# Patient Record
Sex: FEMALE | Race: OTHER | URBAN - METROPOLITAN AREA
[De-identification: names, ages, dates, MRNs, and addresses within clinical notes are randomized per-mention and may not be internally consistent; named-entity substitution may affect disease eponyms.]

---

## 2017-01-25 ENCOUNTER — OUTPATIENT (OUTPATIENT)
Dept: OUTPATIENT SERVICES | Facility: HOSPITAL | Age: 67
LOS: 1 days | Discharge: HOME | End: 2017-01-25

## 2017-05-03 ENCOUNTER — HOSPITAL ENCOUNTER (INPATIENT)
Dept: HOSPITAL 14 - H.ER | Age: 67
LOS: 6 days | Discharge: NURSING, REHAB OR LONG-TERM CARE | DRG: 149 | End: 2017-05-09
Attending: INTERNAL MEDICINE | Admitting: INTERNAL MEDICINE
Payer: MEDICARE

## 2017-05-03 VITALS — BODY MASS INDEX: 35.3 KG/M2

## 2017-05-03 DIAGNOSIS — F41.9: ICD-10-CM

## 2017-05-03 DIAGNOSIS — F32.9: ICD-10-CM

## 2017-05-03 DIAGNOSIS — E87.6: ICD-10-CM

## 2017-05-03 DIAGNOSIS — E78.00: ICD-10-CM

## 2017-05-03 DIAGNOSIS — F03.90: ICD-10-CM

## 2017-05-03 DIAGNOSIS — I69.322: ICD-10-CM

## 2017-05-03 DIAGNOSIS — E11.9: ICD-10-CM

## 2017-05-03 DIAGNOSIS — I11.0: ICD-10-CM

## 2017-05-03 DIAGNOSIS — H83.2X9: Primary | ICD-10-CM

## 2017-05-03 DIAGNOSIS — I50.9: ICD-10-CM

## 2017-05-03 DIAGNOSIS — E78.5: ICD-10-CM

## 2017-05-03 LAB
ALBUMIN/GLOB SERPL: 1.2 {RATIO} (ref 1–2.1)
ALP SERPL-CCNC: 60 U/L (ref 38–126)
ALT SERPL-CCNC: 35 U/L (ref 9–52)
AST SERPL-CCNC: 33 U/L (ref 14–36)
BASOPHILS # BLD AUTO: 0.1 K/UL (ref 0–0.2)
BASOPHILS NFR BLD: 0.8 % (ref 0–2)
BILIRUB SERPL-MCNC: 1.2 MG/DL (ref 0.2–1.3)
BUN SERPL-MCNC: 23 MG/DL (ref 7–17)
CALCIUM SERPL-MCNC: 9.8 MG/DL (ref 8.4–10.2)
CHLORIDE SERPL-SCNC: 101 MMOL/L (ref 98–107)
CO2 SERPL-SCNC: 30 MMOL/L (ref 22–30)
EOSINOPHIL # BLD AUTO: 0.1 K/UL (ref 0–0.7)
EOSINOPHIL NFR BLD: 1.2 % (ref 0–4)
ERYTHROCYTE [DISTWIDTH] IN BLOOD BY AUTOMATED COUNT: 15.7 % (ref 11.5–14.5)
GLOBULIN SER-MCNC: 3.3 GM/DL (ref 2.2–3.9)
GLUCOSE SERPL-MCNC: 133 MG/DL (ref 65–105)
HCT VFR BLD CALC: 35.2 % (ref 34–47)
LYMPHOCYTES # BLD AUTO: 1.9 K/UL (ref 1–4.3)
LYMPHOCYTES NFR BLD AUTO: 19.5 % (ref 20–40)
MCH RBC QN AUTO: 27.8 PG (ref 27–31)
MCHC RBC AUTO-ENTMCNC: 33.2 G/DL (ref 33–37)
MCV RBC AUTO: 83.9 FL (ref 81–99)
MONOCYTES # BLD: 0.6 K/UL (ref 0–0.8)
MONOCYTES NFR BLD: 6 % (ref 0–10)
NEUTROPHILS # BLD: 7.2 K/UL (ref 1.8–7)
NEUTROPHILS NFR BLD AUTO: 72.5 % (ref 50–75)
NRBC BLD AUTO-RTO: 0.1 % (ref 0–0)
PLATELET # BLD: 238 K/UL (ref 130–400)
PMV BLD AUTO: 10.5 FL (ref 7.2–11.7)
POTASSIUM SERPL-SCNC: 3.7 MMOL/L (ref 3.6–5)
PROT SERPL-MCNC: 7.2 G/DL (ref 6.3–8.2)
SODIUM SERPL-SCNC: 141 MMOL/L (ref 132–148)
WBC # BLD AUTO: 9.9 K/UL (ref 4.8–10.8)

## 2017-05-03 RX ADMIN — LATANOPROST SCH DROP: 50 SOLUTION OPHTHALMIC at 23:02

## 2017-05-03 NOTE — RAD
HISTORY:

dizziness  



COMPARISON:

03/14/2014 



FINDINGS:



LUNGS:

Mild bibasilar atelectasis



PLEURA:

No significant pleural effusion identified, no pneumothorax apparent.



CARDIOVASCULAR:

Normal.



OSSEOUS STRUCTURES:

Re- demonstrated is deformity of the right humeral head as well as 

apparent old posttraumatic unfused fracture left humeral head clear 



VISUALIZED UPPER ABDOMEN:

Normal.



OTHER FINDINGS:

None.



IMPRESSION:

Mild bibasilar atelectasis

## 2017-05-03 NOTE — CON
DATE: 05/03/2017



REASON FOR CONSULTATION:  Dizziness.



HISTORY OF PRESENT ILLNESS:  The patient is a 67-year-old female who is brought to the Emergency Room
 with complaints of dizziness.  She has dizziness over the last 4 days; however, it was getting worse
.  Dizziness is described as spinning sensation.  She denies to have any new focal weakness in arms o
r legs.  She has history of cerebrovascular accident with slurring of speech and some weakness of the
 right side.



REVIEW OF SYSTEMS:  Denies any headache.  Positive for dizziness, denies any chest pain, shortness of
 breath.  Positive for some slurring of speech.  Denies any constipation, diarrhea, dysuria, pyuria, 
cough, sputum production, hallucinations.



PAST MEDICAL HISTORY:  Includes, diabetes mellitus, hypertension, hypercholesterolemia, cerebrovascul
ar accident.



MEDICATIONS AT HOME:  Include Rocaltrol, Abilify, aspirin, Januvia, Coreg, vitamin B complex, Xalatan
 eyedrops, Lamictal, Glucotrol, Norvasc, Lasix, omeprazole, Triglide, enalapril, Fosamax, and Lipitor
.



ALLERGIES:  No known drug allergies.



SOCIAL HISTORY:  Denies smoking, use of alcohol or use of any illicit drugs.



FAMILY HISTORY:  Reviewed and noncontributory to this case.



PHYSICAL EXAMINATION:

GENERAL:  The patient is an elderly pleasant female lying on the bed in no acute distress.

VITAL SIGNS:  Her blood pressure is 156/85, heart rate is 49 per minute, breathing at a rate of 16 pe
r minute and her temperature is 98.3 degrees Fahrenheit.

HEENT:  Normocephalic, atraumatic.

NECK:  Supple.  There are no carotid bruits.

LUNGS:  Clear.

CARDIOVASCULAR:  S1, S2 audible.  No murmurs.

ABDOMEN:  Soft, nontender, bowel sounds present.



NEUROLOGIC EXAMINATION:

MENTAL STATUS:  The patient is awake, alert, oriented to place, year, person.  Speech is dysarthric. 
 Naming and repetition is normal.  Memory and cognition appears intact.

CRANIAL NERVES:  Pupils are 2 mm, minimally reactive to light.  Visual fields are full.  Extraocular 
movements are intact.  There is slight decreased nasolabial fold on the right side.  Palate is upgoin
g bilaterally and tongue is midline.

MOTOR:  Tone is normal.  Power on the left side is 5/5.  Power on the right side is 4-5/5.

REFLEXES:  1+ and symmetrical.  Plantars downgoing bilaterally.

GAIT:  Deferred at the moment.



LABORATORY DATA:  Labs reviewed, shows WBC of 9.9, hemoglobin of 11.7, hematocrit 35.2 and platelets 
of 238.  Sodium is 141, potassium 3.7, chloride 101, carbon dioxide 30, BUN of 23, creatinine 1.3, an
d glucose of 133.  She had a CT scan of the head done which showed no acute intracranial abnormality.




IMPRESSION:  

1.  Dizziness, likely secondary to labyrinthine dysfunction.  Rule out any new cerebrovascular accide
nt.  

2.  History of old cerebrovascular accident with dysarthria.



RECOMMENDATIONS:

1. The patient to have MRI of the brain without contrast.

2. The patient also to have a carotid Doppler study.

3. The patient to have an electroencephalogram.

4. The patient to be started on meclizine.

5. The patient to be continued on aspirin and statin with history of previous stroke.

6. Please continue other treatment and supportive care.

7. The patient also to be seen by physical therapy.



Thank you for the opportunity to participate in the care of this patient.





__________________________________________

Christie Beck MD







cc:



DD: 05/03/2017 17:14:50  142

TT: 05/03/2017 21:14:57

Confirmation # [id number]

Dictation # [id number]

jn

## 2017-05-03 NOTE — CT
PROCEDURE:  CT HEAD WITHOUT CONTRAST.



HISTORY:

dizziness



COMPARISON:

06/23/2016. 



TECHNIQUE:

Axial computed tomography images were obtained through the head/brain 

without intravenous contrast.  



Radiation dose:



Total exam DLP = 844.31 mGy-cm.



This CT exam was performed using one or more of the following dose 

reduction techniques: Automated exposure control, adjustment of the 

mA and/or kV according to patient size, and/or use of iterative 

reconstruction technique.



FINDINGS:



HEMORRHAGE:

No intracranial hemorrhage. 



BRAIN:

No mass effect or edema.  Stable cortical atrophy and periventricular 

small vessel disease which appears be disproportionate to that 

expected to the degree of atrophy.  Nonetheless these findings are 

stable.



VENTRICLES:

Unremarkable. No hydrocephalus. 



CALVARIUM:

Unremarkable.



PARANASAL SINUSES:

Stable left sphenoid air cell disease.



MASTOID AIR CELLS:

Unremarkable as visualized. No inflammatory changes.



OTHER FINDINGS:

None.



IMPRESSION:

No acute intracranial abnormalities. No significant findings to 

account for the clinical presentation. No significant interval change 

compared to the prior examination(s).

## 2017-05-03 NOTE — ED PDOC
HPI: Neurologic





- General


Time Seen by Provider: 05/03/17 12:00


Chief Complaint (Nursing): Dizziness/Lightheaded


Chief Complaint (Provider): Dizziness/Lightheaded


Source: patient


Exam Limitations: clinical condition





- History of Present Illness


Timing/Duration: 1 week


Associated Symptoms: denies symptoms


Allergies/Adverse Reactions: 


Allergies





No Known Allergies Allergy (Verified 06/23/16 19:46)


 








Home Medications: 


Ambulatory Orders





ARIPiprazole [Abilify] 5 mg PO HS 05/03/17 


Alendronate [Fosamax] 70 mg PO QWK 05/03/17 


Aspirin [Aspirin EC] 325 mg PO DAILY 05/03/17 


Atorvastatin [Lipitor] 10 mg PO DAILY 05/03/17 


Calcitriol [Rocaltrol] 0.5 mcg PO MOFR 05/03/17 


Carvedilol [Coreg] 6.25 mg PO Q12H 05/03/17 


Dorzolamide 2%/Timolol 0.5% [Cosopt 2%-0.5% Opht] 1 drop EACHEYE BID 05/03/17 


Enalapril/Hydrochlorothiazide [Enalapril-Hctz 5-12.5 mg Tab] 1 tab PO DAILY 05/ 03/17 


Ergocalciferol (Vitamin D2) [Vitamin D2] 50,000 unit PO QWK 05/03/17 


Fenofibrate [Triglide] 160 mg PO DAILY 05/03/17 


Furosemide [Lasix] 40 mg PO DAILY 05/03/17 


GlipiZIDE [Glucotrol] 10 mg PO DAILY 05/03/17 


Latanoprost [Xalatan] 1 drop EACHEYE HS 05/03/17 


Omeprazole [Omeprazole] 20 mg PO DAILY 05/03/17 


SITagliptin [Januvia] 25 mg PO DAILY 05/03/17 


Vitamin B Complex/Vit C/Folic [Nephro-Julio Cesar] 1 tab PO DAILY 05/03/17 


amLODIPine [Norvasc] 10 mg PO DAILY 05/03/17 


lamoTRIgine [Lamictal] 50 mg PO HS 05/03/17 








Additional Complaint(s): 





Patient is a 67 year old female who presents to ED for dizziness for 4 days. 

Patients that's that the room is spinning. Denies any pain. no headache/

vomiting. denies fever/chills. History of CVA with right hemiparalysis 





DR. Martinez 





NIHSS Stroke Scale





- Date/Time Evaluation Performed


When Was NIHSS Performed: Baseline





- How Severe is the Stroke


Level of Consciousness: 0=Alert


LOC to Questions: 0=Both comments correct


LOC to commands: 0=Obeys both correctly


Best Gaze: 0=Normal


Visual: 0=No visual loss


Facial: 1=Minor asymmetry


Motor Arm - Left: 0=No drift


Motor Arm - Right: 1=Drift noted before 10 sec


Motor Leg - Left: 0=No drift


Motor Leg - Right: 1=Drift before 5 sec


Limb Ataxia: 0=Absent


Sensory: 0=Normal


Best Language: 1=Mild to moderate aphasia


Dysarthia: 1=Mild to moderate slurring


Extinction & Inattention (Neglect): 0=Normal, no object


Score: 5





rTPA Inclusion/Exclusion





- Refusal of Treatment


Patient Refused Treatment: No





- Inclusion Criteria for Altepase


Patient is 18 years or Older: Yes


The Clinical Diagnosis of Ischemic Stroke That is Causing a Potentially 

Disabling Neurological Deficit: No


Time of Onset is Well Established to be Less Than 270 Minute Before Treatment 

Would Begin: No


Risk/Benefit Discussed With Patient/Family Member Present: No





- Exclusion Criteria for Altepase


Uncontrolled Hypertension at Time of Treatment (Systolic BP above 185 or 

Diastolic BP above 110 mmHg): No





- Warning to TPA With Conditions


Following Conditions Weighed Against Anticipated Benefit: No





Past Medical History


Reviewed: Historical Data, Nursing Documentation, Vital Signs


Vital Signs: 





 Last Vital Signs











Temp  98.3 F   05/03/17 11:44


 


Pulse  51 L  05/03/17 11:44


 


Resp  18   05/03/17 11:44


 


BP  145/57 L  05/03/17 11:44


 


Pulse Ox  100   05/03/17 11:44














- Medical History


PMH: Anxiety, Arthritis, CHF, CVA (9 years ago in [place]), Dementia

, Depression, Diabetes, HTN, Hypercholesterolemia, Hyperlipidemia, Kidney Stones

, Schizophrenia, Seizures


   Denies: Chronic Kidney Disease





- Surgical History


Surgical History: No Surg Hx





- Family History


Family History: States: Unknown Family Hx





- Living Arrangements


Living Arrangements: With Family





- Social History


Current smoker - smoking cessation education provided: No


Alcohol: None


Drugs: Denies





- Immunization History


Hx Tetanus Toxoid Vaccination: No


Hx Influenza Vaccination: Yes


Hx Pneumococcal Vaccination: No





- Home Medications


Home Medications: 


 Ambulatory Orders











 Medication  Instructions  Recorded


 


ARIPiprazole [Abilify] 5 mg PO HS 05/03/17


 


Alendronate [Fosamax] 70 mg PO QWK 05/03/17


 


Aspirin [Aspirin EC] 325 mg PO DAILY 05/03/17


 


Atorvastatin [Lipitor] 10 mg PO DAILY 05/03/17


 


Calcitriol [Rocaltrol] 0.5 mcg PO MOFR 05/03/17


 


Carvedilol [Coreg] 6.25 mg PO Q12H 05/03/17


 


Dorzolamide 2%/Timolol 0.5% 1 drop EACHEYE BID 05/03/17





[Cosopt 2%-0.5% Opht]  


 


Enalapril/Hydrochlorothiazide 1 tab PO DAILY 05/03/17





[Enalapril-Hctz 5-12.5 mg Tab]  


 


Ergocalciferol (Vitamin D2) 50,000 unit PO QWK 05/03/17





[Vitamin D2]  


 


Fenofibrate [Triglide] 160 mg PO DAILY 05/03/17


 


Furosemide [Lasix] 40 mg PO DAILY 05/03/17


 


GlipiZIDE [Glucotrol] 10 mg PO DAILY 05/03/17


 


Latanoprost [Xalatan] 1 drop EACHEYE HS 05/03/17


 


Omeprazole [Omeprazole] 20 mg PO DAILY 05/03/17


 


SITagliptin [Januvia] 25 mg PO DAILY 05/03/17


 


Vitamin B Complex/Vit C/Folic 1 tab PO DAILY 05/03/17





[Nephro-Julio Cesar]  


 


amLODIPine [Norvasc] 10 mg PO DAILY 05/03/17


 


lamoTRIgine [Lamictal] 50 mg PO HS 05/03/17














- Allergies


Allergies/Adverse Reactions: 


 Allergies











Allergy/AdvReac Type Severity Reaction Status Date / Time


 


No Known Allergies Allergy   Verified 06/23/16 19:46














Review of Systems


ROS Statement: Except As Marked, All Systems Reviewed And Found Negative


Neurological: Positive for: Dizziness





Physical Exam





- Reviewed


Nursing Documentation Reviewed: Yes


Vital Signs Reviewed: Yes





- Physical Exam


Appears: Positive for: Non-toxic, No Acute Distress


Head Exam: Positive for: ATRAUMATIC, NORMAL INSPECTION


Skin: Positive for: Normal Color, Warm, DRY


Eye Exam: Positive for: EOMI, Normal appearance, PERRL


Neck: Positive for: Normal, Painless ROM


Cardiovascular/Chest: Positive for: Regular Rate, Rhythm.  Negative for: Murmur


Respiratory: Positive for: Normal Breath Sounds.  Negative for: Respiratory 

Distress


Gastrointestinal/Abdominal: Positive for: Normal Exam


Extremity: Positive for: Normal ROM.  Negative for: Pedal Edema


Neurologic/Psych: Positive for: Alert, Oriented, Motor/Sensory Deficits (r 

hemipareis due to old stroke), Aphasia (due to old stroke), Facial Droop (due 

to old storke), Other (Chronic right hemiparalysis)





- Laboratory Results


Result Diagrams: 


 05/04/17 05:10





 05/04/17 05:10





- ECG


ECG: Positive for: Interpreted By Me


ECG Rhythm: Positive for: Sinus Bradycardia.  Negative for: ST/T Changes


Rate: 49


O2 Sat by Pulse Oximetry: 100 (RA)


Pulse Ox Interpretation: Normal





- Radiology


X-Ray: Viewed By Me, Read By Radiologist


X-Ray Interpretation: Other (see MDM text for interpretations)





Medical Decision Making


Medical Decision Making: 





Time: 1210





Initial impression: Dizziness rule otu intracranial process, CVA





Initial plan:


-- EKG


-- CMP


-- Troponin


-- CBC


-- CXR





1401


Chest XRay read and reviewed by radiologist


FINDINGS:





LUNGS:


Mild bibasilar atelectasis





PLEURA:


No significant pleural effusion identified, no pneumothorax apparent.





CARDIOVASCULAR:


Normal.





OSSEOUS STRUCTURES:


Re- demonstrated is deformity of the right humeral head as well as apparent old 

posttraumatic unfused fracture left humeral head clear 





VISUALIZED UPPER ABDOMEN:


Normal.





OTHER FINDINGS:


None.





IMPRESSION:


Mild bibasilar atelectasis





1522


Upon reevaluation, pt is neurologically at baseline. 


1545


Upon reevaluation, patient reports feeling dizziness. Patient is given 

Meclizine and will be evaluated by speech pathology. after meclziine pt still 

dizzy  and will be admitted to [facility] on-call Dr morgan. Patient is being admitted 

under Dr. Morgan because her PMD is not affiliated with [facility].





1617


Paged  from neurology as he saw her in April during admission for prior 

stroke


when he called back he said to give case to neurology on call.





1640


Dr Beck called for consult. no answer back.





dx dizziness 





Scribe Attestation:


Documented by Lanie Green acting as a scribe for Cyndy Bailon MD MD Scribe Attestation:


All medical record entries made by the Scribe were at my direction and 

personally dictated by me. I have reviewed the chart and agree that the record 

accurately reflects my personal performance of the history, physical exam, 

medical decision making, and the department course for this patient. I have 

also personally directed, reviewed, and agree with the discharge instructions 

and disposition.








Disposition





- Clinical Impression


Clinical Impression: 


 Dizziness








- Patient ED Disposition


Is Patient to be Admitted: Yes





- Disposition


Disposition Time: 15:00


Condition: FAIR

## 2017-05-04 LAB
ALBUMIN/GLOB SERPL: 1.2 {RATIO} (ref 1–2.1)
ALP SERPL-CCNC: 69 U/L (ref 38–126)
ALT SERPL-CCNC: 37 U/L (ref 9–52)
AST SERPL-CCNC: 33 U/L (ref 14–36)
BILIRUB SERPL-MCNC: 1.6 MG/DL (ref 0.2–1.3)
BUN SERPL-MCNC: 20 MG/DL (ref 7–17)
CALCIUM SERPL-MCNC: 10.1 MG/DL (ref 8.4–10.2)
CHLORIDE SERPL-SCNC: 102 MMOL/L (ref 98–107)
CHOLEST SERPL-MCNC: 136 MG/DL (ref 0–199)
CO2 SERPL-SCNC: 31 MMOL/L (ref 22–30)
ERYTHROCYTE [DISTWIDTH] IN BLOOD BY AUTOMATED COUNT: 15.4 % (ref 11.5–14.5)
GLOBULIN SER-MCNC: 3.4 GM/DL (ref 2.2–3.9)
GLUCOSE SERPL-MCNC: 109 MG/DL (ref 65–105)
HCT VFR BLD CALC: 37.7 % (ref 34–47)
MCH RBC QN AUTO: 27.9 PG (ref 27–31)
MCHC RBC AUTO-ENTMCNC: 33.3 G/DL (ref 33–37)
MCV RBC AUTO: 83.8 FL (ref 81–99)
PLATELET # BLD: 262 K/UL (ref 130–400)
POTASSIUM SERPL-SCNC: 3.6 MMOL/L (ref 3.6–5)
PROT SERPL-MCNC: 7.6 G/DL (ref 6.3–8.2)
SODIUM SERPL-SCNC: 145 MMOL/L (ref 132–148)
T4 SERPL-MCNC: 10.8 UG/DL (ref 5.5–11)
TSH SERPL-ACNC: 1.76 MIU/ML (ref 0.46–4.68)
WBC # BLD AUTO: 10.7 K/UL (ref 4.8–10.8)

## 2017-05-04 RX ADMIN — Medication SCH TAB: at 09:51

## 2017-05-04 RX ADMIN — LATANOPROST SCH DROP: 50 SOLUTION OPHTHALMIC at 21:24

## 2017-05-04 RX ADMIN — ASPIRIN SCH MG: 325 TABLET, DELAYED RELEASE ORAL at 09:56

## 2017-05-04 RX ADMIN — PANTOPRAZOLE SODIUM SCH MG: 40 TABLET, DELAYED RELEASE ORAL at 09:49

## 2017-05-04 RX ADMIN — ENOXAPARIN SODIUM SCH MG: 40 INJECTION SUBCUTANEOUS at 09:51

## 2017-05-04 RX ADMIN — DORZOLAMIDE HYDROCHLORIDE SCH DROP: 20 SOLUTION OPHTHALMIC at 17:37

## 2017-05-04 RX ADMIN — DORZOLAMIDE HYDROCHLORIDE SCH DROP: 20 SOLUTION OPHTHALMIC at 09:51

## 2017-05-04 NOTE — CARD
--------------- APPROVED REPORT --------------





EKG Measurement

Heart [id number]

DE [id number]

[id number]

[id number]

[id number]



<Conclusion>

Sinus bradycardia

Otherwise normal ECG

## 2017-05-04 NOTE — PN
DATE: 05/04/2017



SUBJECTIVE:  The patient is lying on the bed, in no acute distress.  Denies having any headache.  Her
 dizziness is better.



PHYSICAL EXAMINATION:

VITAL SIGNS:  Her blood pressure is 156/78, heart rate is 57 per minute, breathing at a rate of 16 pe
r minute, temperature is 98.2 degrees Fahrenheit.

HEENT:  Normocephalic, atraumatic.

NECK:  Supple.  There are no carotid bruits.

LUNGS:  Clear.

CARDIOVASCULAR:  S1, S2 audible.  No murmurs.

ABDOMEN:  Soft, nontender.  Bowel sounds present.

NEUROLOGIC EXAMINATION:

MENTAL STATUS:  The patient is awake, alert, oriented.  Her speech is dysarthric which is old.

CRANIAL NERVE:  Pupils 2 mm, minimally reactive to light.  Extraocular movements are intact.  There i
s no facial asymmetry.

MOTOR:  She is moving all 4 extremities symmetrically.  Plantars downgoing bilaterally.

GAIT:  Deferred at the moment.  Finger-to-nose shows no dysmetria.



LABORATORY DATA:  Labs reviewed, show WBC 10.7, hemoglobin 12.6, hematocrit 37.7, and platelets of 26
2.  Sodium 145, potassium 3.6, chloride of 102, carbon dioxide 31, BUN of 20, creatinine of 1.4 and g
lucose of 109.



IMPRESSION:

1.  Status post dizziness, likely secondary to labyrinthine dysfunction.

2.  History of old cerebrovascular accident with dysarthria.



RECOMMENDATIONS:

1.  The patient to have MRI of the brain without contrast.

2.  The patient also awaiting carotid Doppler study as well as an electroencephalogram.

3.  The patient's dizziness is better on meclizine.

4.  The patient to be continued on aspirin and statin with her history of previous stroke.

5.  Please continue physical therapy.

6.  Please continue other treatment and supportive care.



Thank you for the opportunity to participate in the care of this patient.





__________________________________________

Christie Beck MD







cc:



DD: 05/04/2017 09:51:19  142

TT: 05/04/2017 10:03:38

Confirmation # [id number]

Dictation # [id number]

tn

## 2017-05-04 NOTE — CP.PCM.HP
History of Present Illness





- History of Present Illness


History of Present Illness: 





66yo F with PMHx HTN, DM2, Osteoporosis, CVA x2(2006, 10/2016) admitted for 

dizziness X4 days. Denies vision change, new focal deficits, new changes in 

speech, new facial droop. Baseline, decreased motor strength in left side of 

body. Had been in [facility] s/p new CVA and d/c 1/2017 from rehab. 





PMHx: as above


FHx: M-DM, HTN. F-DM, HTN


SHx: TKR


Social hx: denies x3


Allergies: NKDA





Present on Admission





- Present on Admission


Any Indicators Present on Admission: No





Review of Systems





- Constitutional


Constitutional: absent: Chills, Fever





- EENT


Eyes: absent: Change in Vision





- Cardiovascular


Cardiovascular: absent: Chest Pain





- Respiratory


Respiratory: absent: Dyspnea





- Gastrointestinal


Gastrointestinal: absent: Abdominal Pain, Diarrhea, Nausea, Vomiting





- Genitourinary


Genitourinary: absent: Dysuria, Hematuria





- Musculoskeletal


Musculoskeletal: Muscle Weakness (chronic).  absent: Back Pain





- Neurological


Neurological: Dizziness, Focal Weakness (chronic), Weakness (chronic).  absent: 

Loss of Vision





Past Patient History





- Infectious Disease


Hx of Infectious Diseases: None





- Tetanus Immunizations


Tetanus Immunization: Unknown





- Past Medical History & Family History


Past Medical History?: Yes





- Past Social History


Smoking Status: Never Smoked





- CARDIAC


Hx Congestive Heart Failure: Yes


Hx Hypercholesterolemia: Yes


Hx Hypertension: Yes





- PULMONARY


Hx Respiratory Disorders: No





- NEUROLOGICAL


Hx Dementia: Yes


Hx Seizures: Yes


Other/Comment: dizzy





- HEENT


Hx HEENT Problems: No





- RENAL


Hx Chronic Kidney Disease: No


Hx Kidney Stones: Yes





- ENDOCRINE/METABOLIC


Hx Endocrine Disorders: Yes (DM)


Hx Diabetes Mellitus Type 2: Yes





- HEMATOLOGICAL/ONCOLOGICAL


Hx Blood Disorders: No





- INTEGUMENTARY


Hx Dermatological Problems: No





- MUSCULOSKELETAL/RHEUMATOLOGICAL


Hx Arthritis: Yes


Hx Falls: No





- GASTROINTESTINAL


Hx Gastrointestinal Disorders: No





- GENITOURINARY/GYNECOLOGICAL


Hx Genitourinary Disorders: No


Other/Comment: kidney stones





- PSYCHIATRIC


Hx Anxiety: Yes


Hx Depression: Yes


Hx Schizophrenia: Yes


Hx Substance Use: No





- SURGICAL HISTORY


Hx Joint Replacement: Yes (TKR, bilateral; last one on the right, May, 2016)


Hx Orthopedic Surgery: Yes (denisse knees)


Other/Comment: colonoscopy and kidney sx pt cannot recall name.





- ANESTHESIA


Hx Anesthesia: Yes


Hx Anesthesia Reactions: No


Hx Malignant Hyperthermia: No





Meds


Allergies/Adverse Reactions: 


 Allergies











Allergy/AdvReac Type Severity Reaction Status Date / Time


 


No Known Allergies Allergy   Verified 06/23/16 19:46














Physical Exam





- Constitutional


Appears: Non-toxic, No Acute Distress





- Head Exam


Head Exam: NORMAL INSPECTION





- Eye Exam


Eye Exam: EOMI, PERRL


Pupil Exam: PERRL





- ENT Exam


ENT Exam: Mucous Membranes Moist





- Neck Exam


Neck exam: Positive for: Normal Inspection





- Respiratory Exam


Respiratory Exam: Clear to Auscultation Bilateral





- Cardiovascular Exam


Cardiovascular Exam: REGULAR RHYTHM





- Extremities Exam


Extremities exam: Positive for: normal inspection





- Neurological Exam


Neurological exam: Alert, Oriented x3


Additional comments: 





CN 7 palsy, otherwise CN2-12 grossly intact


upper ext strength 4/5


lower ext strength 3/5


sensation grossly intact





- Skin


Skin Exam: Dry, Warm





Results





- Vital Signs


Recent Vital Signs: 





 Last Vital Signs











Temp  98.2 F   05/04/17 08:15


 


Pulse  57 L  05/04/17 08:15


 


Resp  20   05/04/17 08:15


 


BP  156/78 H  05/04/17 08:15


 


Pulse Ox  98   05/04/17 08:15














- Labs


Result Diagrams: 


 05/04/17 05:10





 05/04/17 05:10


Labs: 





 Laboratory Results - last 24 hr











  05/03/17 05/03/17 05/04/17





  16:09 21:36 05:10


 


WBC    10.7


 


RBC    4.50


 


Hgb    12.6


 


Hct    37.7


 


MCV    83.8


 


MCH    27.9


 


MCHC    33.3


 


RDW    15.4 H


 


Plt Count    262


 


Sodium   


 


Potassium   


 


Chloride   


 


Carbon Dioxide   


 


Anion Gap   


 


BUN   


 


Creatinine   


 


Est GFR ( Amer)   


 


Est GFR (Non-Af Amer)   


 


POC Glucose (mg/dL)  125 H  124 H 


 


Random Glucose   


 


Calcium   


 


Total Bilirubin   


 


AST   


 


ALT   


 


Alkaline Phosphatase   


 


Total Protein   


 


Albumin   


 


Globulin   


 


Albumin/Globulin Ratio   


 


Triglycerides   


 


Cholesterol   


 


LDL Cholesterol Direct   


 


HDL Cholesterol   


 


Thyroxine (T4)   


 


Total T3   


 


TSH 3rd Generation   














  05/04/17 05/04/17





  05:10 05:15


 


WBC  


 


RBC  


 


Hgb  


 


Hct  


 


MCV  


 


MCH  


 


MCHC  


 


RDW  


 


Plt Count  


 


Sodium  145 


 


Potassium  3.6 


 


Chloride  102 


 


Carbon Dioxide  31 H 


 


Anion Gap  16 


 


BUN  20 H 


 


Creatinine  1.4 H 


 


Est GFR ( Amer)  45 


 


Est GFR (Non-Af Amer)  37 


 


POC Glucose (mg/dL)   110


 


Random Glucose  109 H 


 


Calcium  10.1 


 


Total Bilirubin  1.6 H 


 


AST  33 


 


ALT  37 


 


Alkaline Phosphatase  69 


 


Total Protein  7.6 


 


Albumin  4.2 


 


Globulin  3.4 


 


Albumin/Globulin Ratio  1.2 


 


Triglycerides  103  D 


 


Cholesterol  136 


 


LDL Cholesterol Direct  48 


 


HDL Cholesterol  50 


 


Thyroxine (T4)  10.8 


 


Total T3  1.21 L 


 


TSH 3rd Generation  1.76 














Assessment & Plan





- Assessment and Plan (Free Text)


Assessment: 





66yo F with PMHx HTN, DM2, Osteoporosis, CVA x2(2006, 10/2016) admitted for 

dizziness.





dizziness in pt with h/o CVA


-neuro on board, appreciate input


-CT head no acute change


-tolerating PO


-PT/OT


-EEG


-MRI brain


-carotid US


-c/w statin, ASA


-meclizine


-monitor BP





HTN


-c/w amlodipine, coreg





DM2


-c/w januvia, glypzide





DVT ppx


-lovenox








Decision To Admit





- Pt Status Changed To:


Hospital Disposition Of: Inpatient





- Admit Certification


Admit to Inpatient:: After my assessment, the patient will require 

hospitalization for at least two midnights.  This is because of the severity of 

symptoms shown, intensity of services needed, and/or the medical risk in this 

patient being treated as an outpatient.





- .


Bed Request Type: Telemetry


Admitting Physician: Alvino Hargrove

## 2017-05-05 LAB
ALBUMIN/GLOB SERPL: 1.2 {RATIO} (ref 1–2.1)
ALP SERPL-CCNC: 64 U/L (ref 38–126)
ALT SERPL-CCNC: 32 U/L (ref 9–52)
AST SERPL-CCNC: 22 U/L (ref 14–36)
BILIRUB SERPL-MCNC: 1.2 MG/DL (ref 0.2–1.3)
BUN SERPL-MCNC: 27 MG/DL (ref 7–17)
CALCIUM SERPL-MCNC: 9.8 MG/DL (ref 8.4–10.2)
CHLORIDE SERPL-SCNC: 101 MMOL/L (ref 98–107)
CO2 SERPL-SCNC: 30 MMOL/L (ref 22–30)
ERYTHROCYTE [DISTWIDTH] IN BLOOD BY AUTOMATED COUNT: 15.6 % (ref 11.5–14.5)
FOLATE SERPL-MCNC: > 20 NG/ML
GLOBULIN SER-MCNC: 3.1 GM/DL (ref 2.2–3.9)
GLUCOSE SERPL-MCNC: 90 MG/DL (ref 65–105)
HCT VFR BLD CALC: 38.2 % (ref 34–47)
MCH RBC QN AUTO: 27.9 PG (ref 27–31)
MCHC RBC AUTO-ENTMCNC: 33.2 G/DL (ref 33–37)
MCV RBC AUTO: 84 FL (ref 81–99)
PLATELET # BLD: 253 K/UL (ref 130–400)
POTASSIUM SERPL-SCNC: 3.1 MMOL/L (ref 3.6–5)
PROT SERPL-MCNC: 7 G/DL (ref 6.3–8.2)
SODIUM SERPL-SCNC: 143 MMOL/L (ref 132–148)
WBC # BLD AUTO: 10.9 K/UL (ref 4.8–10.8)

## 2017-05-05 RX ADMIN — POTASSIUM CHLORIDE SCH MLS/HR: 14.9 INJECTION, SOLUTION INTRAVENOUS at 13:27

## 2017-05-05 RX ADMIN — Medication SCH TAB: at 09:43

## 2017-05-05 RX ADMIN — LATANOPROST SCH DROP: 50 SOLUTION OPHTHALMIC at 21:53

## 2017-05-05 RX ADMIN — DORZOLAMIDE HYDROCHLORIDE SCH DROP: 20 SOLUTION OPHTHALMIC at 16:59

## 2017-05-05 RX ADMIN — ASPIRIN SCH MG: 325 TABLET, DELAYED RELEASE ORAL at 13:27

## 2017-05-05 RX ADMIN — POTASSIUM CHLORIDE SCH: 14.9 INJECTION, SOLUTION INTRAVENOUS at 16:58

## 2017-05-05 RX ADMIN — POTASSIUM CHLORIDE SCH: 14.9 INJECTION, SOLUTION INTRAVENOUS at 16:59

## 2017-05-05 RX ADMIN — PANTOPRAZOLE SODIUM SCH MG: 40 TABLET, DELAYED RELEASE ORAL at 09:43

## 2017-05-05 RX ADMIN — ENOXAPARIN SODIUM SCH MG: 40 INJECTION SUBCUTANEOUS at 09:42

## 2017-05-05 RX ADMIN — DORZOLAMIDE HYDROCHLORIDE SCH DROP: 20 SOLUTION OPHTHALMIC at 09:46

## 2017-05-05 NOTE — CP.PCM.PN
Subjective





- Date & Time of Evaluation


Date of Evaluation: 05/05/17


Time of Evaluation: 07:30





- Subjective


Subjective: 





evaluated with attending. no overnight events. Tolerating PO, eating, drinking, 

making urine. No new deficits. Denies chest pain, SOB, abd pain.





Objective





- Vital Signs/Intake and Output


Vital Signs (last 24 hours): 


 











Temp Pulse Resp BP Pulse Ox


 


 98.3 F   59 L  18   154/84 H  97 


 


 05/05/17 04:56  05/05/17 04:56  05/05/17 04:56  05/05/17 04:56  05/05/17 04:56











- Medications


Medications: 


 Current Medications





Alendronate Sodium (Fosamax)  70 mg PO QWK [facility]


Amlodipine Besylate (Norvasc)  10 mg PO DAILY [facility]


   Last Admin: 05/04/17 09:49 Dose:  10 mg


Aripiprazole (Abilify)  5 mg PO HS [facility]


   Last Admin: 05/04/17 21:21 Dose:  5 mg


Aspirin (Ecotrin)  325 mg PO DAILY [facility]


   Last Admin: 05/04/17 09:56 Dose:  325 mg


Atorvastatin Calcium (Lipitor)  10 mg PO DAILY@2200 [facility]


   Last Admin: 05/04/17 21:23 Dose:  10 mg


Calcitriol (Rocaltrol)  0.5 mcg PO MOFR [facility]


Carvedilol (Coreg)  6.25 mg PO Q12H [facility]


   Last Admin: 05/04/17 21:21 Dose:  6.25 mg


Dorzolamide HCl (Trusopt)  1 drop OU BID [facility]


   Last Admin: 05/04/17 17:37 Dose:  1 drop


Enalapril Maleate (Vasotec)  5 mg PO DAILY [facility]


   Last Admin: 05/04/17 09:52 Dose:  5 mg


Enoxaparin Sodium (Lovenox)  40 mg SC DAILY [facility]


   PRN Reason: Protocol


   Last Admin: 05/04/17 09:51 Dose:  40 mg


Ergocalciferol (Drisdol 50,000 Intl Units Cap)  1 cap PO QWK [facility]


Fenofibrate (Tricor)  145 mg PO DAILY [facility]


   Last Admin: 05/04/17 09:52 Dose:  145 mg


Furosemide (Lasix)  40 mg PO DAILY [facility]


   Last Admin: 05/04/17 09:52 Dose:  40 mg


Glipizide (Glucotrol)  10 mg PO DAILY [facility]


   Last Admin: 05/04/17 09:48 Dose:  10 mg


Hydrochlorothiazide (Microzide)  12.5 mg PO DAILY [facility]


   Last Admin: 05/04/17 09:50 Dose:  12.5 mg


Insulin Human Regular (Humulin R)  0 units SC ACHS [facility]


   PRN Reason: Protocol


   Last Admin: 05/05/17 07:01 Dose:  Not Given


Lamotrigine (Lamictal)  50 mg PO HS [facility]


   Last Admin: 05/04/17 21:23 Dose:  50 mg


Latanoprost (Xalatan Opht)  1 drop OU HS [facility]


   Last Admin: 05/04/17 21:24 Dose:  1 drop


Meclizine HCl (Antivert)  12.5 mg PO DAILY [facility]


   Last Admin: 05/04/17 09:54 Dose:  12.5 mg


Pantoprazole Sodium (Protonix Ec Tab)  40 mg PO DAILY [facility]


   Last Admin: 05/04/17 09:49 Dose:  40 mg


Sitagliptin Phosphate (Januvia)  25 mg PO DAILY [facility]


   Last Admin: 05/04/17 09:49 Dose:  25 mg


Timolol Maleate (Timoptic 0.5% Ophth Soln)  1 drop OU BID@1100,1900 [facility]


   Last Admin: 05/04/17 21:24 Dose:  1 drop


Vitamin B Complex/Vit C/Folic Acid (Nephro-Julio Cesar)  1 tab PO DAILY [facility]


   Last Admin: 05/04/17 09:51 Dose:  1 tab











- Labs


Labs: 


 





 05/04/17 05:10 





 05/04/17 05:10 











- Constitutional


Appears: Non-toxic, No Acute Distress





- Head Exam


Head Exam: NORMAL INSPECTION





- Eye Exam


Eye Exam: EOMI, PERRL





- ENT Exam


ENT Exam: Mucous Membranes Moist





- Neck Exam


Neck Exam: Normal Inspection





- Respiratory Exam


Respiratory Exam: Clear to Ausculation Bilateral





- Cardiovascular Exam


Cardiovascular Exam: REGULAR RHYTHM





- GI/Abdominal Exam


GI & Abdominal Exam: Soft, Normal Bowel Sounds





- Extremities Exam


Extremities Exam: Normal Inspection.  absent: Pedal Edema





- Neurological Exam


Neurological Exam: Alert, CN II-XII Intact (CN 7 palsy), Oriented x3


Neuro motor strength exam: Left Upper Extremity: 4, Right Upper Extremity: 4, 

Left Lower Extremity: 3, Right Lower Extremity: 3





- Skin


Skin Exam: Dry, Warm





Assessment and Plan





- Assessment and Plan (Free Text)


Assessment: 





68yo F with PMHx HTN, DM2, Osteoporosis, CVA x2(2006, 10/2016) admitted for 

dizziness.





dizziness in pt with h/o CVA


-neuro on board, appreciate input


-CT head no acute change


-tolerating PO


-PT/OT


-EEG


-MRI brain pending read


-carotid US pending read


-c/w statin, ASA


-meclizine


-monitor BP





hypokalemia


-likely d/t HCTZ


-KCL 40mEq


-K in AM





HTN


-c/w amlodipine, HCTZ


-hold coreg d/t bradycardia





DM2


-c/w januvia, glypzide





DVT ppx


-lovenox





Dispo:


PT/OT eval


will check if pt able to be placed in [facility]

## 2017-05-05 NOTE — EEG
DATE: 05/05/2017



INTRODUCTION:  This is a digitally recorded EEG monitoring using standard EEG montages.



BACKGROUND RHYTHM:  The EEG shows a background activity of 8-9 Hz alpha activity in parietooccipital 
region.  The EEG activity is bilaterally symmetrical and synchronous.  There is attenuation of the ba
ckground activity on eye opening.



ABNORMAL POTENTIALS:  No spikes, sharp waves or focal slowing was seen.



PHOTIC STIMULATION AND HYPERVENTILATION:  Photic stimulation did not reveal any abnormality.  Hyperve
ntilation was not performed.



IMPRESSION:  Normal electroencephalogram.  No epileptiform activity seen in this electroencephalogram
 recording.





__________________________________________

Christie Beck MD







cc:



DD: 05/05/2017 10:26:13  142

TT: 05/05/2017 11:52:14

Confirmation # [id number]

Dictation # [id number]

en

## 2017-05-05 NOTE — OP
PROCEDURE DATE: 05/05/2017



The patient is lying in the bed, in no acute distress.  Denies having any headache or dizziness, feel
s good.  



PHYSICAL EXAMINATION:

VITAL SIGNS:  Her blood pressure is 142/82, heart rate is 54 per minute, breathing at a rate of 16 pe
r minute, temperature is 98.4 degrees Fahrenheit.

HEENT:  Normocephalic, atraumatic.

NECK:  Supple.  There are no carotid bruits.

LUNGS:  Clear.  

CARDIOVASCULAR:  S1, S2 audible.  No murmurs.

ABDOMEN:  Soft, nontender, bowel sounds present.

NEUROLOGY:  Patient is awake, alert, oriented to time, place, person.  She does have dysarthria which
 is old.  Cranial nerves:  Pupils 2 mm, minimally reactive to light.  Visual fields are full.  Extrao
cular movements are intact.  There is no facial asymmetry.  She is moving all 4 extremities symmetric
ally.  Plantars downgoing bilaterally. 



LABORATORY DATA: Reviewed.  Shows WBC of 10.9, hemoglobin 12.7, hematocrit of 38.2, and platelets of 
253.  Sodium is 143, potassium 3.1, chloride of 101, carbon dioxide 30, BUN of 27, creatinine of 1.6 
and glucose of 90.  She had had an electroencephalogram done which is normal.



IMPRESSION:

1.  Status post dizziness likely secondary to labyrinthine dysfunction.

2.  History of old cerebrovascular accident with dysarthria.



RECOMMENDATIONS:

1.  The patient had MRI of the brain done, please follow up the report.

2.  The patient has no further dizziness.

3.  The patient to be continued on meclizine. 

4.  The patient also to be continued on aspirin and statin with her underlying cerebrovascular diseas
e.

5.  Please follow the results of MRI of the brain.

6.  Please also follow up the report of the carotid ultrasound.

7.  Please continue other treatment and supportive care.

8.  The patient to have physical therapy.

9.  Please continue other treatment.



Thank you for the opportunity to participate in the care of this patient.





__________________________________________

Christie Beck MD







cc:



DD: 05/05/2017 10:55:47  142

TT: 05/05/2017 12:09:04

Job # [id number]

david

## 2017-05-05 NOTE — US
Carotid ultrasound 



Indication: Dizziness, old CVA 



Technique: Grayscale, color, and duplex Doppler imaging of the 

bilateral carotid and vertebral arteries. 



Findings: 



Moderate amount of calcified plaque noted bilaterally. 



Peak systolic velocities are as follows (cm/sec)



Right: 



CCA - proximal 42.7 



CCA - mid 61.4 



CCA- distal 57.0 



ECA 68.0 



ICA - proximal 46.0 



ICA - mid 40.6 



ICA - distal 46.0 



Vertebral- antegrade 47.1 



ICA/CCA- 0.8 



Left: 



CCA - proximal 69.1 



CCA - mid 114.0 



CCA- distal 67.2 



ECA 71.1 



ICA - proximal 41.4 



ICA - mid 77.7 



ICA - distal 63.4 



Vertebral- antegrade 55.7 



ICA/CCA- 1.2 



Impression: 



Moderate amount of calcified plaque noted bilaterally. 



No evidence of hemodynamically significant stenosis. 



Measurement of carotid stenosis is based on velocity parameters that 

correlate measurement of carotid stenosis is based on velocity 

parameters that correlate the residual internal carotid diameter with 

that of the more distal vessel in accordance with the [place] 

symptomatic carotid endarterectomy Trial (NASCET).

## 2017-05-05 NOTE — MRI
PROCEDURE:  MRI of the brain dated 05/04/2017 



HISTORY:

cva



COMPARISON:

Comparison made with CT scan of the brain dated 05/03/2017. 



TECHNIQUE:

Multiplanar, multisequence MR images of the brain were obtained 

without intravenous contrast enhancement.



FINDINGS:



HEMORRHAGE:

No acute parenchymal, subarachnoid or extra-axial hemorrhage there is 

a irregular somewhat slit-like area of dark T2 signal within the 

right lateral basal ganglia which could be related to residual 

hemosiderin deposition secondary to old hemorrhage versus basal 

ganglia calcification.  Clinical correlation recommended. 



DWI:

No acute infarcts identified on diffusion-weighted sequence. 



BRAIN PARENCHYMA:

Significant diffuse/confluent chronic white matter ischemic changes 

are seen extending peripherally into the deep and subcortical white 

matter both cerebral hemispheres. In addition, there are multiple 

more discrete deep and subcortical white matter basal nuclei and 

minor chronic brainstem ischemic changes as well. None of these 

changes exhibit restricted diffusion. Moderate generalized volume 

loss. 



VENTRICLES:

Ventricles are enlarged as secondary atrophy however no evidence of 

obstructive type hydrocephalus.



CRANIUM:

Calvarium appears grossly unremarkable.



ORBITS:

Grossly unremarkable.



PARANASAL SINUSES/MASTOIDS:

Subtotal opacification left chamber sphenoid sinus with central area 

of irregular dark T2 signal which could represent microcalcification 

and or inspissated desiccated secretions. 



VASCULAR SYSTEM:

Visualized major vascular flow voids at skull base are patent.



OTHER FINDINGS:

None. 



IMPRESSION:

No acute parenchymal, subarachnoid or extra-axial hemorrhage there is 

a irregular somewhat slit-like area of dark T2 signal within the 

right lateral basal ganglia which could be related to residual 

hemosiderin deposition secondary to old hemorrhage versus basal 

ganglia calcification.  Clinical correlation recommended.



Significant diffuse/confluent chronic white matter ischemic changes 

are seen extending peripherally into the deep and subcortical white 

matter both cerebral hemispheres. In addition, there are multiple 

more discrete deep and subcortical white matter basal nuclei and 

minor chronic brainstem ischemic changes as well. None of these 

changes exhibit restricted diffusion. Moderate generalized volume 

loss.

## 2017-05-06 LAB
BUN SERPL-MCNC: 34 MG/DL (ref 7–17)
CALCIUM SERPL-MCNC: 9.8 MG/DL (ref 8.4–10.2)
CHLORIDE SERPL-SCNC: 99 MMOL/L (ref 98–107)
CO2 SERPL-SCNC: 32 MMOL/L (ref 22–30)
ERYTHROCYTE [DISTWIDTH] IN BLOOD BY AUTOMATED COUNT: 15.5 % (ref 11.5–14.5)
GLUCOSE SERPL-MCNC: 111 MG/DL (ref 65–105)
HCT VFR BLD CALC: 37.8 % (ref 34–47)
MCH RBC QN AUTO: 27.8 PG (ref 27–31)
MCHC RBC AUTO-ENTMCNC: 33.4 G/DL (ref 33–37)
MCV RBC AUTO: 83.4 FL (ref 81–99)
PLATELET # BLD: 257 K/UL (ref 130–400)
POTASSIUM SERPL-SCNC: 3 MMOL/L (ref 3.6–5)
SODIUM SERPL-SCNC: 143 MMOL/L (ref 132–148)
WBC # BLD AUTO: 10.7 K/UL (ref 4.8–10.8)

## 2017-05-06 RX ADMIN — Medication SCH TAB: at 09:02

## 2017-05-06 RX ADMIN — POTASSIUM CHLORIDE SCH: 14.9 INJECTION, SOLUTION INTRAVENOUS at 19:58

## 2017-05-06 RX ADMIN — DORZOLAMIDE HYDROCHLORIDE SCH DROP: 20 SOLUTION OPHTHALMIC at 09:04

## 2017-05-06 RX ADMIN — PANTOPRAZOLE SODIUM SCH MG: 40 TABLET, DELAYED RELEASE ORAL at 09:02

## 2017-05-06 RX ADMIN — POTASSIUM CHLORIDE SCH: 14.9 INJECTION, SOLUTION INTRAVENOUS at 21:40

## 2017-05-06 RX ADMIN — POTASSIUM CHLORIDE SCH MLS/HR: 14.9 INJECTION, SOLUTION INTRAVENOUS at 17:34

## 2017-05-06 RX ADMIN — DORZOLAMIDE HYDROCHLORIDE SCH DROP: 20 SOLUTION OPHTHALMIC at 16:24

## 2017-05-06 RX ADMIN — ENOXAPARIN SODIUM SCH MG: 40 INJECTION SUBCUTANEOUS at 09:01

## 2017-05-06 RX ADMIN — POTASSIUM CHLORIDE SCH: 14.9 INJECTION, SOLUTION INTRAVENOUS at 21:41

## 2017-05-06 RX ADMIN — ASPIRIN SCH MG: 325 TABLET, DELAYED RELEASE ORAL at 09:01

## 2017-05-06 RX ADMIN — LATANOPROST SCH DROP: 50 SOLUTION OPHTHALMIC at 21:31

## 2017-05-07 LAB
BUN SERPL-MCNC: 39 MG/DL (ref 7–17)
CALCIUM SERPL-MCNC: 10 MG/DL (ref 8.4–10.2)
CHLORIDE SERPL-SCNC: 99 MMOL/L (ref 98–107)
CO2 SERPL-SCNC: 31 MMOL/L (ref 22–30)
GLUCOSE SERPL-MCNC: 129 MG/DL (ref 65–105)
POTASSIUM SERPL-SCNC: 3.5 MMOL/L (ref 3.6–5)
SODIUM SERPL-SCNC: 145 MMOL/L (ref 132–148)

## 2017-05-07 RX ADMIN — ASPIRIN SCH MG: 325 TABLET, DELAYED RELEASE ORAL at 10:21

## 2017-05-07 RX ADMIN — Medication SCH TAB: at 10:17

## 2017-05-07 RX ADMIN — DORZOLAMIDE HYDROCHLORIDE SCH DROP: 20 SOLUTION OPHTHALMIC at 10:20

## 2017-05-07 RX ADMIN — DORZOLAMIDE HYDROCHLORIDE SCH DROP: 20 SOLUTION OPHTHALMIC at 16:35

## 2017-05-07 RX ADMIN — ENOXAPARIN SODIUM SCH MG: 40 INJECTION SUBCUTANEOUS at 10:17

## 2017-05-07 RX ADMIN — LATANOPROST SCH DROP: 50 SOLUTION OPHTHALMIC at 21:32

## 2017-05-07 RX ADMIN — PANTOPRAZOLE SODIUM SCH MG: 40 TABLET, DELAYED RELEASE ORAL at 10:19

## 2017-05-07 NOTE — PN
DATE: 05/07/2017



The patient seen and examined.  Interim events noted.  The patient remains in progressive care unit. 
 The patient is awake, responsive, feels okay.  No dizziness, no chest pain, no abdominal pain.



PHYSICAL EXAMINATION:

GENERAL:  The patient is in no acute distress.

VITAL SIGNS:  Stable.

HEART:  S1, S2 normal, regular.

LUNGS:  Good bilateral air entry.

ABDOMEN:  Soft, nontender.

EXTREMITIES:  No edema, no calf swelling, no tenderness, no acute ischemia.

CENTRAL NERVOUS SYSTEM:  Essentially unchanged.



DIAGNOSTIC DATA:  Available reviewed.  Potassium remains low and was replenished.  Repeat potassium i
s pending.  Telemetry monitoring does reveal significant arrhythmias.



Overall, patient's general medical condition is stable.



PLAN:  As ordered.





__________________________________________

Alvino Hargrove MD







cc:



DD: 05/07/2017 06:41:37  659

TT: 05/07/2017 07:47:49

Confirmation # [id number]

Dictation # [id number]

en

## 2017-05-08 VITALS — RESPIRATION RATE: 18 BRPM

## 2017-05-08 RX ADMIN — ASPIRIN SCH MG: 325 TABLET, DELAYED RELEASE ORAL at 09:37

## 2017-05-08 RX ADMIN — ASPIRIN SCH MG: 325 TABLET, DELAYED RELEASE ORAL at 09:22

## 2017-05-08 RX ADMIN — DORZOLAMIDE HYDROCHLORIDE SCH DROP: 20 SOLUTION OPHTHALMIC at 17:08

## 2017-05-08 RX ADMIN — PANTOPRAZOLE SODIUM SCH MG: 40 TABLET, DELAYED RELEASE ORAL at 09:26

## 2017-05-08 RX ADMIN — Medication SCH TAB: at 09:26

## 2017-05-08 RX ADMIN — DORZOLAMIDE HYDROCHLORIDE SCH DROP: 20 SOLUTION OPHTHALMIC at 09:27

## 2017-05-08 NOTE — PN
DATE: 05/08/2017



The patient seen and examined.  Interim events noted.  The patient remains in progressive care unit o
n telemetry monitoring.  She is awake, responsive, feels okay.  No dizziness, no chest pain, no short
ness of breath.  Complains of generalized weakness.



PHYSICAL EXAMINATION:

GENERAL:  The patient is in no acute distress.

VITAL SIGNS:  Stable.

HEART:  S1, S2 normal, regular.

LUNGS:  Good bilateral air exchange.

ABDOMEN:  Soft, nontender.

EXTREMITIES:  No edema, no calf swelling, no tenderness, no acute ischemia.

CENTRAL NERVOUS SYSTEM:  Essentially unchanged.



DIAGNOSTIC DATA:  Available reviewed.  Telemetry monitoring does not reveal significant arrhythmia.



Overall, patient's general medical condition is stable.  Physical therapy _____ is pending.



PLAN:  As ordered.





__________________________________________

Alvino Hargrove MD







cc:



DD: 05/08/2017 08:08:23  659

TT: 05/08/2017 08:47:36

Confirmation # [id number]

Dictation # [id number]

en

## 2017-05-09 VITALS
TEMPERATURE: 98.1 F | SYSTOLIC BLOOD PRESSURE: 102 MMHG | HEART RATE: 65 BPM | OXYGEN SATURATION: 96 % | DIASTOLIC BLOOD PRESSURE: 66 MMHG

## 2017-05-09 RX ADMIN — ASPIRIN SCH MG: 325 TABLET, DELAYED RELEASE ORAL at 09:35

## 2017-05-09 RX ADMIN — DORZOLAMIDE HYDROCHLORIDE SCH DROP: 20 SOLUTION OPHTHALMIC at 09:39

## 2017-05-09 RX ADMIN — PANTOPRAZOLE SODIUM SCH MG: 40 TABLET, DELAYED RELEASE ORAL at 09:37

## 2017-05-09 RX ADMIN — Medication SCH TAB: at 09:35

## 2017-05-09 NOTE — PN
DATE: 05/09/2017



The patient seen and examined.  Interim events noted.  Physical therapy evaluation and recommendation
 noted and appreciated.  The patient remains in progressive care unit on telemetry monitoring.  Feels
 okay.  Denies any dizziness, chest pain, shortness of breath, or any new weakness.



PHYSICAL EXAMINATION:

GENERAL:  The patient is in no acute distress.

VITAL SIGNS:  Stable.

HEART:  S1, S2 normal, regular.

LUNGS:  Good bilateral air exchange.

ABDOMEN:  Soft, nontender.

EXTREMITIES:  No calf swelling, no edema, no calf tenderness.

CENTRAL NERVOUS SYSTEM:  Essentially unchanged.



DIAGNOSTIC DATA:  Available reviewed.  Telemetry monitoring does not reveal significant arrhythmias.



Overall, patient's general medical condition is stable.



PLAN:  As ordered.





__________________________________________

Alvino Hargrove MD







cc:



DD: 05/09/2017 07:21:35  659

TT: 05/09/2017 08:08:01

Confirmation # [id number]

Dictation # [id number]

en

## 2017-05-10 NOTE — PQF GENQUE
Dr. Hargrove neurology consult documents "dizziness likely secondary to 
labyrinthine dysfunction." Do you agree with neurologists findings? If not, 
what is the principal diagnosis for this case? 



***** This form is a permanent part of the medical record*****





          

Clarification of your documentation is requested to better reflect the severity 
of illness and intensity of treatment of your patient.  



Indicators present      



[]  Specify: []

         



[]  Specify: []         

 



[]  Specify: []         





[]  Specify: []         





Location in the medical record that reflects the above clinical findings:  []

 



Treatment Provided:  []

  

PHYSICIAN'S RESPONSE





Based on your medical judgment of the clinical indicators outlined above please 
clarify the following:



[]  Practitioner response 



[]  If unable to determine, please check the box, sign and date.  





Present On Admission (POA) Indicator:





[] Present at the time of admission 



[] Not present at the time of admission



[] Clinically Undetermined









In responding to this query, please exercise your independent professional 
judgment.  The fact that a question is asked does not imply that any particular 
answer is desired or expected.  Thank you for your clarification on this 
documentation.



If you have any questions please call:[ ]

* Thank you,

   [ ]Xochilt Ramirez

   HIM Coder
[facility]

## 2017-06-27 DIAGNOSIS — G40.309 GENERALIZED IDIOPATHIC EPILEPSY AND EPILEPTIC SYNDROMES, NOT INTRACTABLE, WITHOUT STATUS EPILEPTICUS: ICD-10-CM

## 2018-05-04 ENCOUNTER — HOSPITAL ENCOUNTER (INPATIENT)
Dept: HOSPITAL 14 - H.ER | Age: 68
LOS: 5 days | Discharge: NURSING, REHAB OR LONG-TERM CARE | DRG: 69 | End: 2018-05-09
Attending: INTERNAL MEDICINE | Admitting: INTERNAL MEDICINE
Payer: MEDICARE

## 2018-05-04 VITALS — BODY MASS INDEX: 35.3 KG/M2

## 2018-05-04 DIAGNOSIS — F03.90: ICD-10-CM

## 2018-05-04 DIAGNOSIS — H40.9: ICD-10-CM

## 2018-05-04 DIAGNOSIS — I69.322: ICD-10-CM

## 2018-05-04 DIAGNOSIS — M19.90: ICD-10-CM

## 2018-05-04 DIAGNOSIS — I50.22: ICD-10-CM

## 2018-05-04 DIAGNOSIS — E78.5: ICD-10-CM

## 2018-05-04 DIAGNOSIS — Z96.653: ICD-10-CM

## 2018-05-04 DIAGNOSIS — I69.351: ICD-10-CM

## 2018-05-04 DIAGNOSIS — E11.65: ICD-10-CM

## 2018-05-04 DIAGNOSIS — I13.0: ICD-10-CM

## 2018-05-04 DIAGNOSIS — F32.9: ICD-10-CM

## 2018-05-04 DIAGNOSIS — E87.6: ICD-10-CM

## 2018-05-04 DIAGNOSIS — F41.9: ICD-10-CM

## 2018-05-04 DIAGNOSIS — E11.22: ICD-10-CM

## 2018-05-04 DIAGNOSIS — F20.9: ICD-10-CM

## 2018-05-04 DIAGNOSIS — E78.00: ICD-10-CM

## 2018-05-04 DIAGNOSIS — N18.3: ICD-10-CM

## 2018-05-04 DIAGNOSIS — G45.9: Primary | ICD-10-CM

## 2018-05-04 LAB
ALBUMIN SERPL-MCNC: 4.5 G/DL (ref 3.5–5)
ALBUMIN/GLOB SERPL: 1.2 {RATIO} (ref 1–2.1)
ALT SERPL-CCNC: 33 U/L (ref 9–52)
APTT BLD: 32.9 SECONDS (ref 25.6–37.1)
AST SERPL-CCNC: 37 U/L (ref 14–36)
BASE EXCESS BLDV CALC-SCNC: 13.1 MMOL/L (ref 0–2)
BASOPHILS # BLD AUTO: 0.1 K/UL (ref 0–0.2)
BASOPHILS NFR BLD: 0.6 % (ref 0–2)
BUN SERPL-MCNC: 57 MG/DL (ref 7–17)
CALCIUM SERPL-MCNC: 10.7 MG/DL (ref 8.4–10.2)
EOSINOPHIL # BLD AUTO: 0.2 K/UL (ref 0–0.7)
EOSINOPHIL NFR BLD: 1.4 % (ref 0–4)
ERYTHROCYTE [DISTWIDTH] IN BLOOD BY AUTOMATED COUNT: 16.7 % (ref 11.5–14.5)
GFR NON-AFRICAN AMERICAN: 17
HGB BLD-MCNC: 10.7 G/DL (ref 12–16)
INR PPP: 1.6 (ref 0.9–1.2)
LYMPHOCYTES # BLD AUTO: 2.8 K/UL (ref 1–4.3)
LYMPHOCYTES NFR BLD AUTO: 24.5 % (ref 20–40)
MCH RBC QN AUTO: 26.6 PG (ref 27–31)
MCHC RBC AUTO-ENTMCNC: 32.8 G/DL (ref 33–37)
MCV RBC AUTO: 81 FL (ref 81–99)
MONOCYTES # BLD: 1 K/UL (ref 0–0.8)
MONOCYTES NFR BLD: 8.7 % (ref 0–10)
NEUTROPHILS # BLD: 7.4 K/UL (ref 1.8–7)
NEUTROPHILS NFR BLD AUTO: 64.8 % (ref 50–75)
NRBC BLD AUTO-RTO: 0.1 % (ref 0–0)
PCO2 BLDV: 56 MMHG (ref 40–60)
PH BLDV: 7.46 [PH] (ref 7.32–7.43)
PLATELET # BLD: 335 K/UL (ref 130–400)
PMV BLD AUTO: 10 FL (ref 7.2–11.7)
PROTHROMBIN TIME: 17.6 SECONDS (ref 9.8–13.1)
RBC # BLD AUTO: 4.03 MIL/UL (ref 3.8–5.2)
VENOUS BLOOD FIO2: 21 %
VENOUS BLOOD GAS PO2: 36 MM/HG (ref 30–55)
WBC # BLD AUTO: 11.5 K/UL (ref 4.8–10.8)

## 2018-05-04 NOTE — ED PDOC
- Laboratory Results


Result Diagrams: 


 18 18:35





 18 18:35





- ECG


O2 Sat by Pulse Oximetry: 98





Medical Decision Making


Medical Decision Makin:00


Patient endorsed to me from Dr. Melendrez.


Pending CT scan, labs, and neurology consult.





19:41


CT Head


FINDINGS:


Brain: There is dilatation of sulci gyri and ventricles. There is no midline 

shift. There is extensive


decreased attenuation in periventricular white matter, unchanged. There are 

basal ganglia


calcifications bilaterally, unchanged. There are old lacunar infarcts in the 

basal ganglia. There are no


focal masses. There are no focal hemorrhages. Gray-white differentiation is 

visualized.


Ventricles: See above


Bones: Cranial vault is intact.


Soft tissues: unremarkable


Sinuses: There is no acute sinusitis.


Ears and mastoids: Middle ears and mastoids are unremarkable.


Orbits: Orbital contents are unremarkable.


IMPRESSION: Atrophy with extensive small vessel disease, no acute intracranial 

abnormality, no


bleed





20:43


Discussed case with neurologist Dr. Eason and determined stroke on consult.  

Agrees patient is not a TPA candidate.  


Dr. Eason recommended MRI be done along with plavix and aspirin be given.  





20:45


Discussed case with Dr. Hargrove and agreed to admit to telemetry.  Agreed to 

consult neurologist on call.





--------------------------------------------------------------------------------

-----------------


Scribe Attestation:


Documented by Toni Knox acting as a scribe for Stefany Roper MD.





Provider Scribe Attestation:


All medical record entries made by the Scribe were at my direction and 

personally dictated by me. I have reviewed the chart and agree that the record 

accurately reflects my personal performance of the history, physical exam, 

medical decision making, and the department course for this patient. I have 

also personally directed, reviewed, and agree with the discharge instructions 

and disposition.





Disposition


Discussed With : Alvino Hargrove





- Clinical Impression


Clinical Impression: 


 Weakness due to cerebrovascular accident, CVA (cerebral vascular accident), 

Hypokalemia








- POA


Present On Arrival: None





- Disposition


Disposition: Admitted as In-Patient


Disposition Time: 20:40


Condition: FAIR

## 2018-05-04 NOTE — ED PDOC
HPI:STROKE





- Historian


Historian: Nursing home, EMS (This is a 67 yo female NH resident who is brought 

by EMS because family noticed left sided weakness. Patient was last seen at 

baseline by them last night. Today her co-residents noted that she didn't get 

out of bed as usual. However, it was not until the family visited and brought 

it to the attention of the staff that she may have developed new weakness. 

Patient able to make some )





NIHSS Stroke Scale





- Date/Time Evaluation Performed


When Was NIHSS Performed: Baseline





- How Severe is the Stroke


Level of Consciousness: 0=Alert


LOC to Questions: 0=Both comments correct


LOC to commands: 0=Obeys both correctly


Best Gaze: 0=Normal


Visual: 0=No visual loss


Facial: 0=Normal


Motor Arm - Left: 3=No effort against gravity (falls immediately)


Motor Arm - Right: 4=No movement


Motor Leg - Left: 3=No effort against gravity (falls immediately)


Motor Leg - Right: 4=No movement


Sensory: 0=Normal


Best Language: 0=No aphasia


Dysarthia: 1=Mild to moderate slurring


Extinction & Inattention (Neglect): 0=Normal, no object


Severity Of Stroke: 5-15 = Moderate Stroke (Patient has baseline right sided 

hemiparesis)





rTPA Inclusion/Exclusion





- Refusal of Treatment


Patient Refused Treatment: No





- Inclusion Criteria for Altepase


Patient is 18 years or Older: No


The Clinical Diagnosis of Ischemic Stroke That is Causing a Potentially 

Disabling Neurological Deficit: Yes


Time of Onset is Well Established to be Less Than 270 Minute Before Treatment 

Would Begin: No


Risk/Benefit Discussed With Patient/Family Member Present: Yes





- Exclusion Criteria for Altepase


Uncontrolled Hypertension at Time of Treatment (Systolic BP above 185 or 

Diastolic BP above 110 mmHg): No





- Warning to TPA With Conditions


Condition: Increase Risk of Bleed Due to Comorbid Condition


Additional Condition (For 3-4.5 Hour Window): Prior Stroke and Diabetes





Past Medical History


Reviewed: Historical Data


Vital Signs: 





 Last Vital Signs











Temp  98.0 F   05/04/18 18:20


 


Pulse  67   05/04/18 18:20


 


Resp  18   05/04/18 18:20


 


BP  195/78 H  05/04/18 18:20


 


Pulse Ox  98   05/04/18 18:20














- Medical History


PMH: Anxiety, Arthritis (B TKA), CHF, CVA (9 years ago in [place]), 

Dementia, Depression, Diabetes, HTN, Hypercholesterolemia, Hyperlipidemia, 

Kidney Stones, Schizophrenia, Seizures


   Denies: Chronic Kidney Disease





- Family History


Family History: States: Unknown Family Hx





- Immunization History


Hx Tetanus Toxoid Vaccination: No


Hx Influenza Vaccination: Yes


Hx Pneumococcal Vaccination: No





- Home Medications


Home Medications: 


 Ambulatory Orders











 Medication  Instructions  Recorded


 


ARIPiprazole [Abilify] 5 mg PO HS 05/03/17


 


Alendronate [Fosamax] 70 mg PO QWK 05/03/17


 


Aspirin [Aspirin EC] 325 mg PO DAILY 05/03/17


 


Atorvastatin [Lipitor] 10 mg PO DAILY 05/03/17


 


Calcitriol [Rocaltrol] 0.5 mcg PO MOFR 05/03/17


 


Carvedilol [Coreg] 6.25 mg PO Q12H 05/03/17


 


Dorzolamide 2%/Timolol 0.5% 1 drop EACHEYE BID 05/03/17





[Cosopt 2%-0.5% Opht]  


 


Enalapril/Hydrochlorothiazide 1 tab PO DAILY 05/03/17





[Enalapril-Hctz 5-12.5 mg Tab]  


 


Ergocalciferol (Vitamin D2) 50,000 unit PO QWK 05/03/17





[Vitamin D2]  


 


Fenofibrate [Triglide] 160 mg PO DAILY 05/03/17


 


Furosemide [Lasix] 40 mg PO DAILY 05/03/17


 


GlipiZIDE [Glucotrol] 10 mg PO DAILY 05/03/17


 


Latanoprost [Xalatan] 1 drop EACHEYE HS 05/03/17


 


Omeprazole 20 mg PO DAILY 05/03/17


 


SITagliptin [Januvia] 25 mg PO DAILY 05/03/17


 


Vitamin B Complex/Vit C/Folic 1 tab PO DAILY 05/03/17





[Nephro-Julio Cesar]  


 


amLODIPine [Norvasc] 10 mg PO DAILY 05/03/17


 


lamoTRIgine [Lamictal] 50 mg PO HS 05/03/17


 


Meclizine [Antivert] 12.5 mg PO DAILY #30 tab 05/09/17














- Allergies


Allergies/Adverse Reactions: 


 Allergies











Allergy/AdvReac Type Severity Reaction Status Date / Time


 


No Known Allergies Allergy   Verified 06/23/16 19:46














Review of Systems


Review Of Systems: ROS cannot be obtained secondary to pt's inabilty to answer 

questions.





Physical Exam





- Reviewed


Nursing Documentation Reviewed: Yes





- Physical Exam


Head Exam: Positive for: ATRAUMATIC, NORMAL INSPECTION, NORMOCEPHALIC


Skin: Positive for: Normal Color, Warm, DRY


ENT: Positive for: Other (tongue protruded)


Neck: Positive for: Normal


Cardiovascular/Chest: Positive for: Regular Rate, Rhythm


Respiratory: Positive for: Normal Breath Sounds


Gastrointestinal/Abdominal: Positive for: Normal Exam


Back: Positive for: Normal Inspection





- ECG


O2 Sat by Pulse Oximetry: 98





Disposition





- Clinical Impression


Clinical Impression: 


 Weakness due to cerebrovascular accident








- Patient ED Disposition


Is Patient to be Admitted: Transfer of Care





- Disposition


Disposition: Transfer of Care


Disposition Time: 18:53


Condition: FAIR


Instructions:  Weakness (ED)


Patient Signed Over To: Stefany Roper


Present On Arrival: None

## 2018-05-04 NOTE — CT
EXAM:

  CT Head Without Intravenous Contrast



EXAM DATE/TIME:

  5/4/2018 6:40 PM



CLINICAL HISTORY:

  68 years old, female; Signs and symptoms; Other: Weakness left sided; 

Additional info: Left sided weakness, unknown time of onset



TECHNIQUE:

  Axial computed tomography images of the head/brain without intravenous 

contrast.  All CT scans at this facility use one or more dose reduction 

techniques, viz.: automated exposure control; ma/kV adjustment per patient size 

(including targeted exams where dose is matched to indication; i.e. head); or 

iterative reconstruction technique.

  Coronal and sagittal reformatted images were created and reviewed.



COMPARISON:

  CT - HEAD W/O CONTRAST 2017-05-03 14:05



FINDINGS:

  Brain:  There is dilatation of sulci gyri and ventricles. There is no midline 

shift. There is extensive decreased attenuation in periventricular white 

matter, unchanged. There are basal ganglia calcifications bilaterally, 

unchanged. There are old lacunar infarcts in the basal ganglia. There are no 

focal masses. There are no focal hemorrhages. Gray-white differentiation is 

visualized.

  Ventricles:  See above

  Bones: Cranial vault is intact.

  Soft tissues:  unremarkable

  Sinuses:  There is no acute sinusitis.

  Ears and mastoids: Middle ears and mastoids are unremarkable.

  Orbits:  Orbital contents are unremarkable.



IMPRESSION:  Atrophy with extensive small vessel disease, no acute intracranial 

abnormality, no bleed

## 2018-05-05 LAB
ALBUMIN SERPL-MCNC: 4 G/DL (ref 3.5–5)
ALBUMIN/GLOB SERPL: 1.2 {RATIO} (ref 1–2.1)
ALT SERPL-CCNC: 33 U/L (ref 9–52)
AST SERPL-CCNC: 28 U/L (ref 14–36)
BUN SERPL-MCNC: 53 MG/DL (ref 7–17)
CALCIUM SERPL-MCNC: 9.9 MG/DL (ref 8.4–10.2)
ERYTHROCYTE [DISTWIDTH] IN BLOOD BY AUTOMATED COUNT: 16.6 % (ref 11.5–14.5)
GFR NON-AFRICAN AMERICAN: 19
HDLC SERPL-MCNC: 43 MG/DL (ref 30–70)
HGB BLD-MCNC: 9.6 G/DL (ref 12–16)
LDLC SERPL-MCNC: 63 MG/DL (ref 0–129)
MCH RBC QN AUTO: 26.6 PG (ref 27–31)
MCHC RBC AUTO-ENTMCNC: 32.7 G/DL (ref 33–37)
MCV RBC AUTO: 81.3 FL (ref 81–99)
PLATELET # BLD: 291 K/UL (ref 130–400)
RBC # BLD AUTO: 3.6 MIL/UL (ref 3.8–5.2)
T3: 0.83 NMOL/L (ref 1.49–2.6)
T4 SERPL-MCNC: 11.1 UG/DL (ref 5.5–11)
VIT B12 SERPL-MCNC: 663 PG/ML (ref 239–931)
WBC # BLD AUTO: 11.2 K/UL (ref 4.8–10.8)

## 2018-05-05 RX ADMIN — ASPIRIN SCH MG: 325 TABLET, DELAYED RELEASE ORAL at 14:26

## 2018-05-05 RX ADMIN — PANTOPRAZOLE SODIUM SCH MG: 20 TABLET, DELAYED RELEASE ORAL at 14:28

## 2018-05-05 RX ADMIN — DORZOLAMIDE HYDROCHLORIDE SCH DROP: 20 SOLUTION OPHTHALMIC at 18:26

## 2018-05-05 RX ADMIN — DORZOLAMIDE HYDROCHLORIDE SCH DROP: 20 SOLUTION OPHTHALMIC at 14:30

## 2018-05-05 RX ADMIN — Medication SCH TAB: at 14:27

## 2018-05-05 NOTE — MRI
PROCEDURE:  MRI of the brain dated 05/05/2018 



HISTORY:

Left-sided stroke 



COMPARISON:

Comparison made with CT scan brain 05/04/2018 the 



TECHNIQUE:

Multiplanar, multisequence MR images of the brain were obtained 

without intravenous contrast enhancement.



FINDINGS:



HEMORRHAGE:

No acute parenchymal, subarachnoid nor extra-axial hemorrhage. 



DWI:

No evidence of an acute or early subacute infarction seen on 

diffusion imaging. .



BRAIN PARENCHYMA:

Significant diffuse/confluent chronic periventricular white matter 

ischemic changes seen extending peripherally into the deep and 

subcortical white matter both cerebral hemispheres.  There is also 

extension these changes into the white matter tracts of both basal 

nuclei. Multiple more discrete chronic appearing lacunar type 

infarcts scattered about the deep and subcortical white matter, both 

basal nuclei and brainstem. 



Moderate generalized volume loss. 



VENTRICLES:

No obstructive hydrocephalus.



CRANIUM:

There are no acute calvarial abnormalities



ORBITS:

Orbits and contents grossly unremarkable



PARANASAL SINUSES/MASTOIDS:

Frontal sinuses are slightly underpneumatized - hypoplastic. 

Mucoperiosteal inflammatory changes again noted left chamber sphenoid 

sinus.



VASCULAR SYSTEM:

Visualized major vascular flow voids at skull base patent.



OTHER FINDINGS:

None. 



No acute intracranial hemorrhage. 



IMPRESSION:

No acute intracranial hemorrhage. 



Extensive chronic white matter and basal nuclei ischemic changes.  

There also mild chronic ischemic changes seen within the brainstem as 

well. 



Moderate volume loss.

## 2018-05-05 NOTE — CARD
--------------- APPROVED REPORT --------------





EKG Measurement

Heart [id number]

PA [id number]

[id number]

[id number]

[id number]



<Conclusion>

Normal sinus rhythm with sinus arrhythmia

Moderate voltage criteria for LVH, may be normal variant

Borderline ECG

## 2018-05-05 NOTE — CP.PCM.CON
History of Present Illness





- History of Present Illness


History of Present Illness: 





   68 [place] woman[place], with pmh of dementia, CHF, HTN, prior lacunar 

strokes,  who presented with left sided weakness noticed by family, last seen 

by them normal last night. She did not have a headache, aphasia, dysarthria or 

seizures. However, she had some trembling of her left arm. 








NIHSS Stroke Scale: 0











PMH/PSH; as above


FH/SH: [place]


All: nkda. 





on exam: 


awake, alert, knows her name, not date. STicking out her tongue, but no facial 

asymmetry. 


moving all her limbs equally, no weakness appreciated now. 


+1 dtr ul and ll bl. Toes downgoing. no clonus. 








Past Patient History





- Infectious Disease


Hx of Infectious Diseases: None





- Tetanus Immunizations


Tetanus Immunization: Unknown





- Past Medical History & Family History


Past Medical History?: Yes





- Past Social History


Smoking Status: Never Smoked





- CARDIAC


Hx Congestive Heart Failure: Yes


Hx Hypercholesterolemia: Yes


Hx Hypertension: Yes





- PULMONARY


Hx Respiratory Disorders: No





- NEUROLOGICAL


Hx Dementia: Yes


Hx Seizures: Yes





- HEENT


Hx HEENT Problems: No





- RENAL


Hx Chronic Kidney Disease: No


Hx Kidney Stones: Yes





- ENDOCRINE/METABOLIC


Hx Diabetes Mellitus Type 2: Yes





- HEMATOLOGICAL/ONCOLOGICAL


Hx Blood Disorders: No





- INTEGUMENTARY


Hx Dermatological Problems: No





- MUSCULOSKELETAL/RHEUMATOLOGICAL


Hx Arthritis: Yes (B TKA)


Hx Falls: Yes





- GASTROINTESTINAL


Hx Gastrointestinal Disorders: No





- GENITOURINARY/GYNECOLOGICAL


Hx Genitourinary Disorders: No


Other/Comment: kidney stones





- PSYCHIATRIC


Hx Anxiety: Yes


Hx Depression: Yes


Hx Schizophrenia: Yes


Hx Substance Use: No





- SURGICAL HISTORY


Hx Joint Replacement: Yes (TKR, bilateral; last one on the right, May, 2016)


Hx Orthopedic Surgery: Yes (denisse knees)


Other/Comment: colonoscopy and kidney sx pt cannot recall name.





- ANESTHESIA


Hx Anesthesia: Yes


Hx Anesthesia Reactions: No


Hx Malignant Hyperthermia: No





Meds


Allergies/Adverse Reactions: 


 Allergies











Allergy/AdvReac Type Severity Reaction Status Date / Time


 


No Known Allergies Allergy   Verified 06/23/16 19:46














- Medications


Medications: 


 Current Medications





Acetaminophen (Tylenol 325mg Tab)  650 mg PO Q4 PRN


   PRN Reason: Pain, moderate (4-7)


Alendronate Sodium (Fosamax)  70 mg PO QWK [facility]


Amlodipine Besylate (Norvasc)  10 mg PO DAILY [facility]


Aripiprazole (Abilify)  5 mg PO HS [facility]


Aspirin (Ecotrin)  325 mg PO DAILY [facility]


Atorvastatin Calcium (Lipitor)  10 mg PO DAILY [facility]


Calcitriol (Rocaltrol)  0.5 mcg PO MOFR [facility]


Carvedilol (Coreg)  6.25 mg PO Q12@0900,2100 [facility]


Enalapril Maleate (Vasotec)  5 mg PO DAILY [facility]


Ergocalciferol (Drisdol 50,000 Intl Units Cap)  1 cap PO THU [facility]


Fenofibrate (Tricor)  145 mg PO DAILY [facility]


Glipizide (Glucotrol)  10 mg PO DAILY [facility]


Hydrochlorothiazide (Microzide)  12.5 mg PO DAILY [facility]


Sodium Chloride (Sodium Chloride 0.9%)  1,000 mls @ 100 mls/hr IV .Q10H [facility]


   Stop: 05/06/18 06:11


   Last Admin: 05/05/18 07:04 Dose:  100 mls/hr


Lamotrigine (Lamictal)  50 mg PO HS [facility]


Pantoprazole Sodium (Protonix Ec Tab)  20 mg PO DAILY [facility]


Rivaroxaban (Xarelto)  15 mg PO DAILY [facility]


   PRN Reason: Protocol


Sitagliptin Phosphate (Januvia)  25 mg PO DAILY [facility]


Timolol Maleate (Timoptic 0.5% [facility])  1 drop OU HS [facility]


Vitamin B Complex/Vit C/Folic Acid (Nephro-Julio Cesar)  1 tab PO DAILY [facility]











Results





- Vital Signs


Recent Vital Signs: 


 Last Vital Signs











Temp  97.9 F   05/05/18 08:15


 


Pulse  59 L  05/05/18 08:15


 


Resp  20   05/05/18 08:15


 


BP  115/72   05/05/18 08:15


 


Pulse Ox  98   05/05/18 08:15














- Labs


Result Diagrams: 


 05/05/18 07:30





 05/05/18 07:30


Labs: 


 Laboratory Results - last 24 hr











  05/04/18 05/04/18 05/04/18





  18:35 18:35 18:35


 


WBC  11.5 H  


 


RBC  4.03  


 


Hgb  10.7 L  


 


Hct  32.7 L  


 


MCV  81.0  D  


 


MCH  26.6 L  


 


MCHC  32.8 L  


 


RDW  16.7 H  


 


Plt Count  335  


 


MPV  10.0  


 


Neut % (Auto)  64.8  


 


Lymph % (Auto)  24.5  


 


Mono % (Auto)  8.7  


 


Eos % (Auto)  1.4  


 


Baso % (Auto)  0.6  


 


Neut # (Auto)  7.4 H  


 


Lymph # (Auto)  2.8  


 


Mono # (Auto)  1.0 H  


 


Eos # (Auto)  0.2  


 


Baso # (Auto)  0.1  


 


PT    17.6 H


 


INR    1.6 H


 


APTT    32.9


 


pO2   


 


VBG pH   


 


VBG pCO2   


 


VBG HCO3   


 


VBG Total CO2   


 


VBG O2 Sat (Calc)   


 


VBG Base Excess   


 


VBG Potassium   


 


Glucose   


 


Lactate   


 


FiO2   


 


Sodium   146 


 


Potassium   2.8 L 


 


Chloride   96 L 


 


Carbon Dioxide   37 H 


 


Anion Gap   16 


 


BUN   57 H 


 


Creatinine   2.8 H 


 


Est GFR ( Amer)   20 


 


Est GFR (Non-Af Amer)   17 


 


POC Glucose (mg/dL)   


 


Random Glucose   109 H 


 


Calcium   10.7 H 


 


Magnesium   


 


Total Bilirubin   0.6 


 


AST   37 H 


 


ALT   33 


 


Alkaline Phosphatase   34 L 


 


Total Protein   8.1 


 


Albumin   4.5 


 


Globulin   3.7 


 


Albumin/Globulin Ratio   1.2 


 


Triglycerides   


 


Cholesterol   


 


LDL Cholesterol Direct   


 


HDL Cholesterol   


 


Vitamin B12   


 


Thyroxine (T4)   


 


Total T3   


 


TSH 3rd Generation   


 


Venous Blood Potassium   














  05/04/18 05/04/18 05/04/18





  18:50 18:53 19:45


 


WBC   


 


RBC   


 


Hgb   


 


Hct   


 


MCV   


 


MCH   


 


MCHC   


 


RDW   


 


Plt Count   


 


MPV   


 


Neut % (Auto)   


 


Lymph % (Auto)   


 


Mono % (Auto)   


 


Eos % (Auto)   


 


Baso % (Auto)   


 


Neut # (Auto)   


 


Lymph # (Auto)   


 


Mono # (Auto)   


 


Eos # (Auto)   


 


Baso # (Auto)   


 


PT   


 


INR   


 


APTT   


 


pO2  36  


 


VBG pH  7.46 H  


 


VBG pCO2  56  


 


VBG HCO3  34.0  


 


VBG Total CO2  41.5 H  


 


VBG O2 Sat (Calc)  66.9 H  


 


VBG Base Excess  13.1 H  


 


VBG Potassium  2.8 L  


 


Glucose  113 H  


 


Lactate  0.9  


 


FiO2  21.0  


 


Sodium  142.0  


 


Potassium   


 


Chloride  100.0  


 


Carbon Dioxide   


 


Anion Gap   


 


BUN   


 


Creatinine   


 


Est GFR ( Amer)   


 


Est GFR (Non-Af Amer)   


 


POC Glucose (mg/dL)   105 


 


Random Glucose   


 


Calcium   


 


Magnesium    2.1


 


Total Bilirubin   


 


AST   


 


ALT   


 


Alkaline Phosphatase   


 


Total Protein   


 


Albumin   


 


Globulin   


 


Albumin/Globulin Ratio   


 


Triglycerides   


 


Cholesterol   


 


LDL Cholesterol Direct   


 


HDL Cholesterol   


 


Vitamin B12   


 


Thyroxine (T4)   


 


Total T3   


 


TSH 3rd Generation   


 


Venous Blood Potassium  2.8 L  














  05/05/18 05/05/18 05/05/18





  05:26 07:30 07:30


 


WBC   11.2 H 


 


RBC   3.60 L 


 


Hgb   9.6 L 


 


Hct   29.3 L 


 


MCV   81.3 


 


MCH   26.6 L 


 


MCHC   32.7 L 


 


RDW   16.6 H 


 


Plt Count   291 


 


MPV   


 


Neut % (Auto)   


 


Lymph % (Auto)   


 


Mono % (Auto)   


 


Eos % (Auto)   


 


Baso % (Auto)   


 


Neut # (Auto)   


 


Lymph # (Auto)   


 


Mono # (Auto)   


 


Eos # (Auto)   


 


Baso # (Auto)   


 


PT   


 


INR   


 


APTT   


 


pO2   


 


VBG pH   


 


VBG pCO2   


 


VBG HCO3   


 


VBG Total CO2   


 


VBG O2 Sat (Calc)   


 


VBG Base Excess   


 


VBG Potassium   


 


Glucose   


 


Lactate   


 


FiO2   


 


Sodium    149 H


 


Potassium    3.1 L


 


Chloride    101


 


Carbon Dioxide    35 H


 


Anion Gap    16


 


BUN    53 H


 


Creatinine    2.5 H


 


Est GFR ( Amer)    23


 


Est GFR (Non-Af Amer)    19


 


POC Glucose (mg/dL)  83  


 


Random Glucose    80


 


Calcium    9.9


 


Magnesium   


 


Total Bilirubin    0.6


 


AST    28


 


ALT    33


 


Alkaline Phosphatase    28 L


 


Total Protein    7.3


 


Albumin    4.0


 


Globulin    3.3


 


Albumin/Globulin Ratio    1.2


 


Triglycerides    72  D


 


Cholesterol    134


 


LDL Cholesterol Direct    63


 


HDL Cholesterol    43


 


Vitamin B12    663


 


Thyroxine (T4)    11.1 H


 


Total T3    0.827 L


 


TSH 3rd Generation    1.22


 


Venous Blood Potassium   














Assessment & Plan





- Assessment and Plan (Free Text)


Assessment: 





MRI Brain: no new strokes. 


cT Head: shows atrophy. 





68 yr old woman with schizophrenia, on lamictal, chf, htn, and prior lacunar 

strokes, who is having shaking spells that are most likely not epilepsy. 

Nonetheless we will order EEG and hold off on starting meds until we check the 

EEG. These movements may also be behavioral. 





Thank you 


Dr Eason

## 2018-05-06 RX ADMIN — DORZOLAMIDE HYDROCHLORIDE SCH: 20 SOLUTION OPHTHALMIC at 08:58

## 2018-05-06 RX ADMIN — DORZOLAMIDE HYDROCHLORIDE SCH DROP: 20 SOLUTION OPHTHALMIC at 08:43

## 2018-05-06 RX ADMIN — Medication SCH: at 12:22

## 2018-05-06 RX ADMIN — PANTOPRAZOLE SODIUM SCH: 20 TABLET, DELAYED RELEASE ORAL at 12:22

## 2018-05-06 RX ADMIN — POTASSIUM PHOSPHATE, MONOBASIC AND POTASSIUM PHOSPHATE, DIBASIC SCH MLS/HR: 224; 236 INJECTION, SOLUTION, CONCENTRATE INTRAVENOUS at 20:50

## 2018-05-06 RX ADMIN — ASPIRIN SCH MG: 325 TABLET, DELAYED RELEASE ORAL at 08:38

## 2018-05-06 RX ADMIN — DORZOLAMIDE HYDROCHLORIDE SCH: 20 SOLUTION OPHTHALMIC at 13:00

## 2018-05-06 RX ADMIN — POTASSIUM PHOSPHATE, MONOBASIC AND POTASSIUM PHOSPHATE, DIBASIC SCH MLS/HR: 224; 236 INJECTION, SOLUTION, CONCENTRATE INTRAVENOUS at 22:58

## 2018-05-06 RX ADMIN — Medication SCH TAB: at 08:40

## 2018-05-06 RX ADMIN — DORZOLAMIDE HYDROCHLORIDE SCH DROP: 20 SOLUTION OPHTHALMIC at 16:31

## 2018-05-06 RX ADMIN — PANTOPRAZOLE SODIUM SCH MG: 20 TABLET, DELAYED RELEASE ORAL at 08:43

## 2018-05-06 RX ADMIN — POTASSIUM PHOSPHATE, MONOBASIC AND POTASSIUM PHOSPHATE, DIBASIC SCH MLS/HR: 224; 236 INJECTION, SOLUTION, CONCENTRATE INTRAVENOUS at 18:16

## 2018-05-06 RX ADMIN — ASPIRIN SCH: 325 TABLET, DELAYED RELEASE ORAL at 12:20

## 2018-05-06 NOTE — CP.PCM.PN
Subjective





- Date & Time of Evaluation


Date of Evaluation: 05/06/18


Time of Evaluation: 09:46





- Subjective


Subjective: 





Ms. Combs was seen and examined at the bedside. She is awake, request for 

assistance with the bedpan, but refused to use it. She has episode of 

confusion.She mumble words with some clarity and also incomprehensible words. 

She is able to utter"kaka, nada", but unable to verbalize person, time, and 

place. She moves her extremities spontaneously with left upper extremity weaker 

than the right, but her right lower extremity is weaker than the left. There 

was no untoward events overnight.





Objective





- Vital Signs/Intake and Output


Vital Signs (last 24 hours): 


 











Temp Pulse Resp BP Pulse Ox


 


 98.7 F   67   18   146/74   100 


 


 05/06/18 08:34  05/06/18 08:41  05/06/18 08:34  05/06/18 08:41  05/06/18 08:34








Intake and Output: 


 











 05/06/18 05/06/18





 06:59 18:59


 


Intake Total 2060 


 


Balance 2060 














- Medications


Medications: 


 Current Medications





Acetaminophen (Tylenol 325mg Tab)  650 mg PO Q4 PRN


   PRN Reason: Pain, moderate (4-7)


Alendronate Sodium (Fosamax)  70 mg PO QWK [facility]


Amlodipine Besylate (Norvasc)  10 mg PO DAILY [facility]


   Last Admin: 05/06/18 08:41 Dose:  10 mg


Aripiprazole (Abilify)  5 mg PO HS [facility]


   Last Admin: 05/05/18 21:23 Dose:  5 mg


Aspirin (Ecotrin)  325 mg PO DAILY [facility]


   Last Admin: 05/06/18 08:38 Dose:  325 mg


Atorvastatin Calcium (Lipitor)  10 mg PO DAILY [facility]


   Last Admin: 05/06/18 08:38 Dose:  10 mg


Calcitriol (Rocaltrol)  0.5 mcg PO MOFR [facility]


Carvedilol (Coreg)  6.25 mg PO Q12@0900,2100 [facility]


   Last Admin: 05/06/18 08:38 Dose:  Not Given


Dorzolamide HCl (Trusopt)  1 drop OU TID [facility]


   Last Admin: 05/06/18 08:58 Dose:  Not Given


Enalapril Maleate (Vasotec)  5 mg PO DAILY [facility]


   Last Admin: 05/06/18 08:44 Dose:  5 mg


Ergocalciferol (Drisdol 50,000 Intl Units Cap)  1 cap PO U [facility]


Fenofibrate (Tricor)  145 mg PO DAILY [facility]


   Last Admin: 05/05/18 18:25 Dose:  145 mg


Glipizide (Glucotrol)  10 mg PO DAILY [facility]


   Last Admin: 05/06/18 08:39 Dose:  10 mg


Hydrochlorothiazide (Microzide)  12.5 mg PO DAILY [facility]


   Last Admin: 05/06/18 08:39 Dose:  12.5 mg


Lamotrigine (Lamictal)  50 mg PO HS [facility]


   Last Admin: 05/05/18 21:18 Dose:  50 mg


Pantoprazole Sodium (Protonix Ec Tab)  20 mg PO DAILY [facility]


   Last Admin: 05/06/18 08:43 Dose:  20 mg


Rivaroxaban (Xarelto)  15 mg PO DAILY [facility]


   PRN Reason: Protocol


   Last Admin: 05/06/18 08:44 Dose:  15 mg


Sitagliptin Phosphate (Januvia)  25 mg PO DAILY [facility]


   Last Admin: 05/06/18 08:39 Dose:  25 mg


Timolol Maleate (Timoptic 0.5% Op[facility])  1 drop OU BID [facility]


   Last Admin: 05/06/18 08:43 Dose:  1 drop


Vitamin B Complex/Vit C/Folic Acid (Nephro-Julio Cesar)  1 tab PO DAILY [facility]


   Last Admin: 05/06/18 08:40 Dose:  1 tab











- Labs


Labs: 


 





 05/05/18 07:30 





 05/05/18 07:30 





 











PT  17.6 Seconds (9.8-13.1)  H  05/04/18  18:35    


 


INR  1.6  (0.9-1.2)  H  05/04/18  18:35    


 


APTT  32.9 Seconds (25.6-37.1)   05/04/18  18:35    














- Constitutional


Appears: No Acute Distress





- Head Exam


Head Exam: NORMAL INSPECTION





- Neurological Exam


Neuro motor strength exam: Left Upper Extremity: 3, Right Upper Extremity: 4, 

Left Lower Extremity: 5, Right Lower Extremity: 3


Additional comments: 





She is able to utter"kaka, nada", but unable to verbalize person, time, and 

place. She moves her extremities spontaneously with left upper extremity weaker 

than the right, but her right lower extremity is weaker than the left. 





Assessment and Plan


(1) Seizure disorder


Assessment & Plan: 


Case discussed with Dr. Eason, continue all current medical regimen. Follow up 

EEG in am, additional medical treatment will be determine with the result of 

EEG.


Status: Acute

## 2018-05-07 LAB
ALBUMIN SERPL-MCNC: 3.5 G/DL (ref 3.5–5)
ALBUMIN/GLOB SERPL: 1.2 {RATIO} (ref 1–2.1)
ALT SERPL-CCNC: 34 U/L (ref 9–52)
AST SERPL-CCNC: 29 U/L (ref 14–36)
BUN SERPL-MCNC: 36 MG/DL (ref 7–17)
CALCIUM SERPL-MCNC: 9.1 MG/DL (ref 8.4–10.2)
ERYTHROCYTE [DISTWIDTH] IN BLOOD BY AUTOMATED COUNT: 16.5 % (ref 11.5–14.5)
GFR NON-AFRICAN AMERICAN: 23
HGB BLD-MCNC: 9.3 G/DL (ref 12–16)
MCH RBC QN AUTO: 27 PG (ref 27–31)
MCHC RBC AUTO-ENTMCNC: 33 G/DL (ref 33–37)
MCV RBC AUTO: 81.8 FL (ref 81–99)
PLATELET # BLD: 275 K/UL (ref 130–400)
RBC # BLD AUTO: 3.45 MIL/UL (ref 3.8–5.2)
WBC # BLD AUTO: 9 K/UL (ref 4.8–10.8)

## 2018-05-07 RX ADMIN — PANTOPRAZOLE SODIUM SCH MG: 20 TABLET, DELAYED RELEASE ORAL at 08:46

## 2018-05-07 RX ADMIN — DORZOLAMIDE HYDROCHLORIDE SCH DROP: 20 SOLUTION OPHTHALMIC at 12:05

## 2018-05-07 RX ADMIN — DORZOLAMIDE HYDROCHLORIDE SCH DROP: 20 SOLUTION OPHTHALMIC at 16:47

## 2018-05-07 RX ADMIN — Medication SCH TAB: at 08:45

## 2018-05-07 RX ADMIN — DORZOLAMIDE HYDROCHLORIDE SCH DROP: 20 SOLUTION OPHTHALMIC at 08:48

## 2018-05-07 RX ADMIN — ASPIRIN SCH MG: 325 TABLET, DELAYED RELEASE ORAL at 08:43

## 2018-05-07 NOTE — PQF GENQUE
Dr. Hargrove,



 The attending physician is required to clarify conflicting documentation in 
the medical record.  The following documentation is noted in the medical record:

Diagnosis 1:  CVA      Documented by: Attending     Location: [facility] 

Diagnosis 2:  On 5/6: Seizure Disorder    Documented by: APN working with 
Neurologist   Location: progress note 



5/5 Neurology consult: hx. prior lacunar strokes: Assessment:  MRI Brain: no 
new strokes. CT Head: shows atrophy. 68 yr. old woman with schizophrenia, on 
lamictal, chf, htn, and prior lacunar strokes, who is having shaking spells 
that are most likely not epilepsy. Nonetheless we will order EEG and hold off 
on starting meds until we check the EEG. These movements may also be 
behavioral. 



5/6 Neurology APN  note: (1) Seizure disorder Assessment Plan: Case discussed 
with Dr. Eason, continue all current medical regimen. Follow up EEG in am, 
additional 

medical treatment will be determine with the result of EEG. Status: Acute 





EEG report pending 



       



***** This form is a permanent part of the medical record***



          

Clarification of your documentation is requested to better reflect the severity 
of illness and intensity of treatment of your patient.  



Indicators present      



[]  Specify: []

         



[]  Specify: []         

 



[]  Specify: []         





[]  Specify: []         





Location in the medical record that reflects the above clinical findings:  []

 



Treatment Provided:  []

  

PHYSICIAN'S RESPONSE





Based on your medical judgment of the clinical indicators outlined above please 
clarify the following:



[]  Practitioner response 



[]  If unable to determine, please check the box, sign and date.  





Present On Admission (POA) Indicator:





[] Present at the time of admission 



[] Not present at the time of admission



[] Clinically Undetermined









In responding to this query, please exercise your independent professional 
judgment.  The fact that a question is asked does not imply that any particular 
answer is desired or expected.  Thank you for your clarification on this 
documentation.



If you have any questions please call.

* Thank you,

   Mojgan Bartlett RN 

   ext. #[phone] 
MTDD

## 2018-05-07 NOTE — CP.PCM.PN
Subjective





- Date & Time of Evaluation


Date of Evaluation: 05/07/18


Time of Evaluation: 17:39





- Subjective


Subjective: 





Ms. Combs was seen and examined at the bedside. She is awake,  has episode of 

confusion.She mumble words with some clarity and also incomprehensible words. 

She is unable to verbalize person, time, and place. She moves her extremities 

spontaneously with left upper extremity weaker than the right, but her right 

lower extremity is weaker than the left. According to staff, patient behavior 

is much better than yesterday. There was no untoward events overnight.





Objective





- Vital Signs/Intake and Output


Vital Signs (last 24 hours): 


 











Temp Pulse Resp BP Pulse Ox


 


 98.2 F   72   17   104/66   100 


 


 05/07/18 16:25  05/07/18 16:25  05/07/18 16:25  05/07/18 16:25  05/07/18 16:25











- Medications


Medications: 


 Current Medications





Acetaminophen (Tylenol 325mg Tab)  650 mg PO Q4 PRN


   PRN Reason: Pain, moderate (4-7)


Alendronate Sodium (Fosamax)  70 mg PO QWK [facility]


Amlodipine Besylate (Norvasc)  10 mg PO DAILY [facility]


   Last Admin: 05/07/18 08:48 Dose:  10 mg


Aripiprazole (Abilify)  5 mg PO HS [facility]


   Last Admin: 05/06/18 22:04 Dose:  5 mg


Aspirin (Ecotrin)  325 mg PO DAILY [facility]


   Last Admin: 05/07/18 08:43 Dose:  325 mg


Atorvastatin Calcium (Lipitor)  10 mg PO DAILY [facility]


   Last Admin: 05/07/18 08:44 Dose:  10 mg


Calcitriol (Rocaltrol)  0.5 mcg PO MOFR [facility]


   Last Admin: 05/07/18 05:38 Dose:  0.5 mcg


Carvedilol (Coreg)  6.25 mg PO Q12@0900,2100 [facility]


   Last Admin: 05/07/18 08:44 Dose:  Not Given


Dorzolamide HCl (Trusopt)  1 drop OU TID [facility]


   Last Admin: 05/07/18 16:47 Dose:  1 drop


Enalapril Maleate (Vasotec)  5 mg PO DAILY [facility]


   Last Admin: 05/07/18 11:58 Dose:  5 mg


Ergocalciferol (Drisdol 50,000 Intl Units Cap)  1 cap PO THU [facility]


Fenofibrate (Tricor)  145 mg PO DAILY [facility]


   Last Admin: 05/07/18 08:43 Dose:  145 mg


Glipizide (Glucotrol)  10 mg PO DAILY [facility]


   Last Admin: 05/07/18 08:44 Dose:  10 mg


Hydrochlorothiazide (Microzide)  12.5 mg PO DAILY [facility]


   Last Admin: 05/07/18 08:45 Dose:  12.5 mg


Insulin Human Regular (Humulin R)  0 units SC ACHS [facility]


   PRN Reason: Protocol


   Last Admin: 05/07/18 16:48 Dose:  Not Given


Lamotrigine (Lamictal)  50 mg PO HS [facility]


   Last Admin: 05/06/18 22:05 Dose:  50 mg


Pantoprazole Sodium (Protonix Ec Tab)  20 mg PO DAILY [facility]


   Last Admin: 05/07/18 08:46 Dose:  20 mg


Rivaroxaban (Xarelto)  15 mg PO DAILY [facility]


   PRN Reason: Protocol


   Last Admin: 05/07/18 08:43 Dose:  15 mg


Sitagliptin Phosphate (Januvia)  25 mg PO DAILY [facility]


   Last Admin: 05/07/18 08:44 Dose:  25 mg


Timolol Maleate (Timoptic 0.5% Ophth Soln)  1 drop OU BID [facility]


   Last Admin: 05/07/18 16:48 Dose:  1 drop


Vitamin B Complex/Vit C/Folic Acid (Nephro-Julio Cesar)  1 tab PO DAILY [facility]


   Last Admin: 05/07/18 08:45 Dose:  1 tab











- Labs


Labs: 


 





 05/07/18 04:10 





 05/07/18 04:10 





 











PT  17.6 Seconds (9.8-13.1)  H  05/04/18  18:35    


 


INR  1.6  (0.9-1.2)  H  05/04/18  18:35    


 


APTT  32.9 Seconds (25.6-37.1)   05/04/18  18:35    














- Constitutional


Appears: No Acute Distress





- Head Exam


Head Exam: NORMAL INSPECTION





- Neurological Exam


Neurological Exam: Awake


Neuro motor strength exam: Left Upper Extremity: 3, Right Upper Extremity: 4, 

Left Lower Extremity: 4


Additional comments: 





She is awake, moves all extremities, but unable to follow simple commands.





Assessment and Plan


(1) Seizure disorder


Assessment & Plan: 


Case discussed with Dr. Hernandez, continue all current medical, physical, and 

occupational therapies. Pending EEG and echocardiogram results.


Status: Acute

## 2018-05-07 NOTE — HP
CHIEF COMPLAINT:  Worsening of weakness.



HISTORY OF PRESENT ILLNESS:  This is a 68-year-old female who has had

history of CVA in the past, who is a nursing home resident, very poor

historian, who was found by family to have left-sided weakness, so the

patient was sent to the hospital and was admitted for further management. 

As mentioned earlier, the patient is a very poor historian and history is

not available from the patient as well as review of systems is not

available as well.



PAST MEDICAL HISTORY:  Significant for CVA, elevated cholesterol,

hypertension, diabetes, dyspepsia, and glaucoma.  The patient also has

history of schizophrenia from psychiatric point of view and also has

seizures.



PAST SURGICAL HISTORY:  Unremarkable.



MEDICATIONS:  The patient is taking multiple medications including Abilify,

_____, Lipitor, _____, Coreg, lisinopril and hydrochlorothiazide

combination, vitamin D, fenofibrate, Lasix, glipizide, latanoprost,

omeprazole, sitagliptin, vitamin B and C complex, amlodipine, lamotrigine,

and meclizine.



ALLERGIES:  THE PATIENT IS NOT ALLERGIC TO ANY MEDICATIONS.



FAMILY HISTORY:  Noncontributory.



PHYSICAL EXAMINATION:

GENERAL:  A well-built and well-nourished, overweight 68-year-old female,

non-communicative, in no acute distress.

VITAL SIGNS:  Temperature 97.6, pulse 60, respirations 18, and blood

pressure 128/75.

HEENT:  The patient has glaucoma.  No JVD.  No thyromegaly.  No

lymphadenopathy.  No nystagmus.  Normocephalic and atraumatic skull.

HEART:  S1 and S2, normal and regular.  No significant murmur, gallop or

rub is heard.

LUNGS:  Exam shows good bilateral air exchange.  No rales or rhonchi.

ABDOMEN:  Soft and nontender.  No organomegaly.  No fluids.  Bowel sounds

are plus and normal.

EXTREMITIES:  No edema.  No calf swelling.  No tenderness.  No acute

ischemia.

CENTRAL NERVOUS SYSTEM:  Exam is difficult to evaluate as _____ not

cooperative, the patient with advanced dementia and previous CVA.



DIAGNOSTIC DATA:  Available diagnostic data reviewed.  Telemetry monitoring

does not show any significant arrhythmias.  WBC 11.5, hemoglobin 10.7,

hematocrit 32.7, and platelets 335,000.  PT 17.6 and PTT 32.9.  Sodium 146,

potassium 2.8, chloride 96, bicarb 37, BUN 57, and creatinine _____ is

unremarkable.



EKG does not reveal any acute ST-T changes.



ADMITTING IMPRESSION:  Cerebrovascular accident, hypertension, elevated

cholesterol, diabetes type 2 with hyperglycemia, schizophrenia, seizure,

advanced dementia, congestive heart failure systolic, chronic arthritis,

and anxiety.





PLAN:  As ordered.  Case and plan discussed with nursing staff and

emergency room physician.





__________________________________________

Alvino Hargrove MD





DD:  05/05/2018 7:40:10

DT:  05/05/2018 10:19:46

Job # [id number]

## 2018-05-08 RX ADMIN — ASPIRIN SCH MG: 325 TABLET, DELAYED RELEASE ORAL at 11:11

## 2018-05-08 RX ADMIN — DORZOLAMIDE HYDROCHLORIDE SCH DROP: 20 SOLUTION OPHTHALMIC at 15:17

## 2018-05-08 RX ADMIN — Medication SCH TAB: at 11:12

## 2018-05-08 RX ADMIN — DORZOLAMIDE HYDROCHLORIDE SCH DROP: 20 SOLUTION OPHTHALMIC at 17:41

## 2018-05-08 RX ADMIN — DORZOLAMIDE HYDROCHLORIDE SCH DROP: 20 SOLUTION OPHTHALMIC at 11:05

## 2018-05-08 RX ADMIN — PANTOPRAZOLE SODIUM SCH MG: 20 TABLET, DELAYED RELEASE ORAL at 11:12

## 2018-05-08 NOTE — CP.PCM.PN
Subjective





- Date & Time of Evaluation


Date of Evaluation: 05/08/18


Time of Evaluation: 17:08





- Subjective


Subjective: 





Ms. Combs was seen and examined at the bedside. She is alert, follows simple 

commands, able to move all extremities with left upper extremity weaker than 

the right and right leg weaker than the left. Preliminary EEG report is normal. 

There was no untoward events overnight.





Objective





- Vital Signs/Intake and Output


Vital Signs (last 24 hours): 


 











Temp Pulse Resp BP Pulse Ox


 


 98.5 F   66   16   108/65   97 


 


 05/08/18 16:16  05/08/18 16:16  05/08/18 16:16  05/08/18 16:16  05/08/18 16:16











- Medications


Medications: 


 Current Medications





Acetaminophen (Tylenol 325mg Tab)  650 mg PO Q4 PRN


   PRN Reason: Pain, moderate (4-7)


Alendronate Sodium (Fosamax)  70 mg PO QWK [facility]


Amlodipine Besylate (Norvasc)  10 mg PO DAILY [facility]


   Last Admin: 05/08/18 12:15 Dose:  10 mg


Aripiprazole (Abilify)  5 mg PO HS [facility]


   Last Admin: 05/07/18 21:25 Dose:  5 mg


Aspirin (Ecotrin)  325 mg PO DAILY [facility]


   Last Admin: 05/08/18 11:11 Dose:  325 mg


Atorvastatin Calcium (Lipitor)  10 mg PO DAILY [facility]


   Last Admin: 05/08/18 11:12 Dose:  10 mg


Calcitriol (Rocaltrol)  0.5 mcg PO MOFR [facility]


   Last Admin: 05/07/18 05:38 Dose:  0.5 mcg


Carvedilol (Coreg)  6.25 mg PO Q12@0900,2100 [facility]


   Last Admin: 05/08/18 11:10 Dose:  6.25 mg


Dorzolamide HCl (Trusopt)  1 drop OU TID [facility]


   Last Admin: 05/08/18 15:17 Dose:  1 drop


Enalapril Maleate (Vasotec)  5 mg PO DAILY [facility]


   Last Admin: 05/08/18 12:15 Dose:  5 mg


Ergocalciferol (Drisdol 50,000 Intl Units Cap)  1 cap PO THU [facility]


Fenofibrate (Tricor)  145 mg PO DAILY [facility]


   Last Admin: 05/08/18 11:06 Dose:  145 mg


Glipizide (Glucotrol)  10 mg PO DAILY [facility]


   Last Admin: 05/08/18 11:12 Dose:  10 mg


Hydrochlorothiazide (Microzide)  12.5 mg PO DAILY [facility]


   Last Admin: 05/08/18 12:14 Dose:  12.5 mg


Insulin Human Regular (Humulin R)  0 units SC ACHS [facility]


   PRN Reason: Protocol


   Last Admin: 05/08/18 12:12 Dose:  2 unit


Lamotrigine (Lamictal)  50 mg PO HS [facility]


   Last Admin: 05/07/18 21:25 Dose:  50 mg


Pantoprazole Sodium (Protonix Ec Tab)  20 mg PO DAILY [facility]


   Last Admin: 05/08/18 11:12 Dose:  20 mg


Rivaroxaban (Xarelto)  15 mg PO DAILY [facility]


   PRN Reason: Protocol


   Last Admin: 05/08/18 11:13 Dose:  15 mg


Sitagliptin Phosphate (Januvia)  25 mg PO DAILY [facility]


   Last Admin: 05/08/18 11:12 Dose:  25 mg


Timolol Maleate (Timoptic 0.5% Ophth Soln)  1 drop OU BID [facility]


   Last Admin: 05/08/18 11:05 Dose:  1 drop


Vitamin B Complex/Vit C/Folic Acid (Nephro-Julio Cesar)  1 tab PO DAILY [facility]


   Last Admin: 05/08/18 11:12 Dose:  1 tab











- Labs


Labs: 


 





 05/07/18 04:10 





 05/07/18 04:10 





 











PT  17.6 Seconds (9.8-13.1)  H  05/04/18  18:35    


 


INR  1.6  (0.9-1.2)  H  05/04/18  18:35    


 


APTT  32.9 Seconds (25.6-37.1)   05/04/18  18:35    














- Constitutional


Appears: No Acute Distress





- Head Exam


Head Exam: NORMAL INSPECTION





- Neurological Exam


Neurological Exam: Alert, Awake


Neuro motor strength exam: Left Upper Extremity: 3, Right Upper Extremity: 4, 

Left Lower Extremity: 4, Right Lower Extremity: 3


Additional comments: 





Neurological unchanged from previous examination.





Assessment and Plan


(1) Seizure disorder


Assessment & Plan: 


Case discussed with Dr. Hernandez, continue all current medical, physical, 

occupational therapies. Withe MRI of the brain normal and EEG normal, there is 

no new recommendation from neurology and is signing off from this case. Please 

re-consult as needed.


Status: Acute

## 2018-05-08 NOTE — CP.PCM.PN
Subjective





- Date & Time of Evaluation


Date of Evaluation: 05/08/18


Time of Evaluation: 07:10





- Subjective


Subjective: 





Patient seen and examined bedside with Dr Hargrove. Patient awake, alert, able to 

talk but incomprehensible words. Reports that she understands the questions, 

but her answers are incomprehensibles. Able to move 4 ext, but will motor 

deficit noted left arm and right leg, that is weaker than left. No overnight 

events. Patient nodding his head denies headache, chest pain, SOB, abd pain 





Objective





- Vital Signs/Intake and Output


Vital Signs (last 24 hours): 


 











Temp Pulse Resp BP Pulse Ox


 


 98.1 F   63   20   136/80   96 


 


 05/08/18 07:35  05/08/18 07:35  05/08/18 07:35  05/08/18 07:35  05/08/18 07:35











- Medications


Medications: 


 Current Medications





Acetaminophen (Tylenol 325mg Tab)  650 mg PO Q4 PRN


   PRN Reason: Pain, moderate (4-7)


Alendronate Sodium (Fosamax)  70 mg PO QWK [facility]


Amlodipine Besylate (Norvasc)  10 mg PO DAILY [facility]


   Last Admin: 05/07/18 08:48 Dose:  10 mg


Aripiprazole (Abilify)  5 mg PO HS [facility]


   Last Admin: 05/07/18 21:25 Dose:  5 mg


Aspirin (Ecotrin)  325 mg PO DAILY [facility]


   Last Admin: 05/07/18 08:43 Dose:  325 mg


Atorvastatin Calcium (Lipitor)  10 mg PO DAILY [facility]


   Last Admin: 05/07/18 08:44 Dose:  10 mg


Calcitriol (Rocaltrol)  0.5 mcg PO MOFR [facility]


   Last Admin: 05/07/18 05:38 Dose:  0.5 mcg


Carvedilol (Coreg)  6.25 mg PO Q12@0900,2100 [facility]


   Last Admin: 05/07/18 21:25 Dose:  6.25 mg


Dorzolamide HCl (Trusopt)  1 drop OU TID [facility]


   Last Admin: 05/07/18 16:47 Dose:  1 drop


Enalapril Maleate (Vasotec)  5 mg PO DAILY [facility]


   Last Admin: 05/07/18 11:58 Dose:  5 mg


Ergocalciferol (Drisdol 50,000 Intl Units Cap)  1 cap PO THU [facility]


Fenofibrate (Tricor)  145 mg PO DAILY [facility]


   Last Admin: 05/07/18 08:43 Dose:  145 mg


Glipizide (Glucotrol)  10 mg PO DAILY [facility]


   Last Admin: 05/07/18 08:44 Dose:  10 mg


Hydrochlorothiazide (Microzide)  12.5 mg PO DAILY [facility]


   Last Admin: 05/07/18 08:45 Dose:  12.5 mg


Insulin Human Regular (Humulin R)  0 units SC ACHS [facility]


   PRN Reason: Protocol


   Last Admin: 05/07/18 16:48 Dose:  Not Given


Lamotrigine (Lamictal)  50 mg PO HS [facility]


   Last Admin: 05/07/18 21:25 Dose:  50 mg


Pantoprazole Sodium (Protonix Ec Tab)  20 mg PO DAILY [facility]


   Last Admin: 05/07/18 08:46 Dose:  20 mg


Rivaroxaban (Xarelto)  15 mg PO DAILY [facility]


   PRN Reason: Protocol


   Last Admin: 05/07/18 08:43 Dose:  15 mg


Sitagliptin Phosphate (Januvia)  25 mg PO DAILY [facility]


   Last Admin: 05/07/18 08:44 Dose:  25 mg


Timolol Maleate (Timoptic 0.5% [facility])  1 drop OU BID [facility]


   Last Admin: 05/07/18 16:48 Dose:  1 drop


Vitamin B Complex/Vit C/Folic Acid (Nephro-Julio Cesar)  1 tab PO DAILY [facility]


   Last Admin: 05/07/18 08:45 Dose:  1 tab











- Labs


Labs: 


 





 05/07/18 04:10 





 05/07/18 04:10 





 











PT  17.6 Seconds (9.8-13.1)  H  05/04/18  18:35    


 


INR  1.6  (0.9-1.2)  H  05/04/18  18:35    


 


APTT  32.9 Seconds (25.6-37.1)   05/04/18  18:35    














- Constitutional


Appears: Non-toxic, No Acute Distress





- Head Exam


Head Exam: ATRAUMATIC, NORMOCEPHALIC





- Eye Exam


Eye Exam: Normal appearance





- ENT Exam


ENT Exam: Mucous Membranes Moist





- Neck Exam


Neck Exam: Full ROM





- Respiratory Exam


Respiratory Exam: Clear to Ausculation Bilateral.  absent: Rales, Rhonchi, 

Wheezes





- Cardiovascular Exam


Cardiovascular Exam: REGULAR RHYTHM, +S1, +S2





- GI/Abdominal Exam


GI & Abdominal Exam: Soft, Normal Bowel Sounds.  absent: Tenderness





- Extremities Exam


Extremities Exam: absent: Calf Tenderness, Pedal Edema





- Neurological Exam


Neurological Exam: Alert, Awake


Neuro motor strength exam: Left Upper Extremity: 4, Right Upper Extremity: 5, 

Left Lower Extremity: 5, Right Lower Extremity: 4





- Psychiatric Exam


Psychiatric exam: Normal Mood





- Skin


Skin Exam: Intact





Assessment and Plan





- Assessment and Plan (Free Text)


Plan: 





Assessment/Plan





1) TIA


-left side motor deficit


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


-MRI Radhames: no acute infarct. No intracranial hemorrhage


-CT head: no acute intracranial hemorrhage. Atrophy with extensive small vessel 

disease.  


-Neurology consult appreciated


-c/w aspirin, Lipitor


-PT,OT, speech therapy 


-f/u ECHO





2) Hx/o seizure


Neurology consult appreciated: shaking spells are probably not epilepsy


-Pending EEG results 





3) Schizophrenia


c/w home medications





4) HTN


-c/w carvedidol, Norvasc, vasotec








5) DM


c/w Januvia, glipizide








6) DVT prophylaxis


On xarelto 15 mg daily

## 2018-05-09 VITALS
SYSTOLIC BLOOD PRESSURE: 111 MMHG | DIASTOLIC BLOOD PRESSURE: 68 MMHG | TEMPERATURE: 98 F | HEART RATE: 67 BPM | OXYGEN SATURATION: 96 %

## 2018-05-09 VITALS — RESPIRATION RATE: 18 BRPM

## 2018-05-09 RX ADMIN — Medication SCH TAB: at 09:44

## 2018-05-09 RX ADMIN — PANTOPRAZOLE SODIUM SCH MG: 20 TABLET, DELAYED RELEASE ORAL at 09:45

## 2018-05-09 RX ADMIN — ASPIRIN SCH MG: 325 TABLET, DELAYED RELEASE ORAL at 09:41

## 2018-05-09 RX ADMIN — DORZOLAMIDE HYDROCHLORIDE SCH: 20 SOLUTION OPHTHALMIC at 16:43

## 2018-05-09 RX ADMIN — DORZOLAMIDE HYDROCHLORIDE SCH DROP: 20 SOLUTION OPHTHALMIC at 09:46

## 2018-05-09 NOTE — CP.PCM.DIS
Provider





- Provider


Date of Admission: 


05/04/18 20:42





Attending physician: 


Alvino Hargrove MD





Consults: 





Neurologist Dr Hernandez


Time Spent in preparation of Discharge (in minutes): 25





Hospital Course





- Lab Results


Lab Results: 


 Most Recent Lab Values











WBC  9.0 K/uL (4.8-10.8)   05/07/18  04:10    


 


RBC  3.45 Mil/uL (3.80-5.20)  L  05/07/18  04:10    


 


Hgb  9.3 g/dL (12.0-16.0)  L  05/07/18  04:10    


 


Hct  28.3 % (34.0-47.0)  L  05/07/18  04:10    


 


MCV  81.8 fl (81.0-99.0)   05/07/18  04:10    


 


MCH  27.0 pg (27.0-31.0)   05/07/18  04:10    


 


MCHC  33.0 g/dL (33.0-37.0)   05/07/18  04:10    


 


RDW  16.5 % (11.5-14.5)  H  05/07/18  04:10    


 


Plt Count  275 K/uL (130-400)   05/07/18  04:10    


 


MPV  10.0 fl (7.2-11.7)   05/04/18  18:35    


 


Neut % (Auto)  64.8 % (50.0-75.0)   05/04/18  18:35    


 


Lymph % (Auto)  24.5 % (20.0-40.0)   05/04/18  18:35    


 


Mono % (Auto)  8.7 % (0.0-10.0)   05/04/18  18:35    


 


Eos % (Auto)  1.4 % (0.0-4.0)   05/04/18  18:35    


 


Baso % (Auto)  0.6 % (0.0-2.0)   05/04/18  18:35    


 


Neut # (Auto)  7.4 K/uL (1.8-7.0)  H  05/04/18  18:35    


 


Lymph # (Auto)  2.8 K/uL (1.0-4.3)   05/04/18  18:35    


 


Mono # (Auto)  1.0 K/uL (0.0-0.8)  H  05/04/18  18:35    


 


Eos # (Auto)  0.2 K/uL (0.0-0.7)   05/04/18  18:35    


 


Baso # (Auto)  0.1 K/uL (0.0-0.2)   05/04/18  18:35    


 


PT  17.6 Seconds (9.8-13.1)  H  05/04/18  18:35    


 


INR  1.6  (0.9-1.2)  H  05/04/18  18:35    


 


APTT  32.9 Seconds (25.6-37.1)   05/04/18  18:35    


 


pO2  36 mm/Hg (30-55)   05/04/18  18:50    


 


VBG pH  7.46  (7.32-7.43)  H  05/04/18  18:50    


 


VBG pCO2  56 mmHg (40-60)   05/04/18  18:50    


 


VBG HCO3  34.0 mmol/L  05/04/18  18:50    


 


VBG Total CO2  41.5 mmol/L (22-28)  H  05/04/18  18:50    


 


VBG O2 Sat (Calc)  66.9 % (40-65)  H  05/04/18  18:50    


 


VBG Base Excess  13.1 mmol/L (0.0-2.0)  H  05/04/18  18:50    


 


VBG Potassium  2.8 mmol/L (3.6-5.2)  L  05/04/18  18:50    


 


Sodium  142.0 mmol/L (132-148)   05/04/18  18:50    


 


Chloride  100.0 mmol/L ()   05/04/18  18:50    


 


Glucose  113 mg/dL ()  H  05/04/18  18:50    


 


Lactate  0.9 mmol/L (0.7-2.1)   05/04/18  18:50    


 


FiO2  21.0 %  05/04/18  18:50    


 


Sodium  147 mmol/l (132-148)   05/07/18  04:10    


 


Potassium  3.7 MMOL/L (3.6-5.0)   05/07/18  04:10    


 


Chloride  104 mmol/L ()   05/07/18  04:10    


 


Carbon Dioxide  32 mmol/L (22-30)  H  05/07/18  04:10    


 


Anion Gap  15  (10-20)   05/07/18  04:10    


 


BUN  36 mg/dl (7-17)  H  05/07/18  04:10    


 


Creatinine  2.1 mg/dl (0.7-1.2)  H  05/07/18  04:10    


 


Est GFR ( Amer)  28   05/07/18  04:10    


 


Est GFR (Non-Af Amer)  23   05/07/18  04:10    


 


POC Glucose (mg/dL)  201 mg/dL ()  H  05/09/18  10:43    


 


Random Glucose  102 mg/dL ()   05/07/18  04:10    


 


Calcium  9.1 mg/dL (8.4-10.2)   05/07/18  04:10    


 


Magnesium  2.1 MG/DL (1.6-2.3)   05/04/18  19:45    


 


Total Bilirubin  0.6 mg/dl (0.2-1.3)   05/07/18  04:10    


 


AST  29 U/L (14-36)   05/07/18  04:10    


 


ALT  34 U/L (9-52)   05/07/18  04:10    


 


Alkaline Phosphatase  24 U/L ()  L  05/07/18  04:10    


 


Total Protein  6.5 G/DL (6.3-8.2)   05/07/18  04:10    


 


Albumin  3.5 g/dL (3.5-5.0)   05/07/18  04:10    


 


Globulin  3.0 gm/dL (2.2-3.9)   05/07/18  04:10    


 


Albumin/Globulin Ratio  1.2  (1.0-2.1)   05/07/18  04:10    


 


Triglycerides  72 mg/DL (0-149)  D 05/05/18  07:30    


 


Cholesterol  134 mg/dL (0-199)   05/05/18  07:30    


 


LDL Cholesterol Direct  63 mg/dL (0-129)   05/05/18  07:30    


 


HDL Cholesterol  43 MG/DL (30-70)   05/05/18  07:30    


 


Vitamin B12  663 pg/mL (239-931)   05/05/18  07:30    


 


Thyroxine (T4)  11.1 ug/dl (5.5-11.0)  H  05/05/18  07:30    


 


Total T3  0.827 nmol/L (1.49-2.60)  L  05/05/18  07:30    


 


TSH 3rd Generation  1.22 mIU/ML (0.46-4.68)   05/05/18  07:30    


 


Venous Blood Potassium  2.8 mmol/L (3.6-5.2)  L  05/04/18  18:50    














- Hospital Course


Hospital Course: 





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 

seizure, schizophrenia, HTN, DM admitted for possible CVA and hypokalemia. 

Patient evaluated by evaluated by neurologist. -CT head: no acute intracranial 

hemorrhage. Atrophy with extensive small vessel disease.  -MRI Radhames: no acute 

infarct. No intracranial hemorrhage. EEG normal. Echo normal.Hypokalemia 

resolved.  Patient during hospitalization hemodynamically stable, mild motor 

deficit RUE and LLW with moderate improvement. No imaging findings for acute CVA

, may be a TIA on admission resolved. Patient seen and examined with Dr Hargrove. 

Patient cleared to be discharge by Dr Hargrove and Neurologist to go to  Acute 

Rehab at [facility].


--------------------------------------------------------------------------------

-----------------------------------------------








Diagnosis





1) TIA


-resolved





2) Hx/o CVA 


-with residual right hemiparesis and dysarthria


-chronic





3) Hx/o seizure


Neurology consult appreciated: shaking spells are probably not epilepsy


-EEG normal 





4) Schizophrenia


c/w home medications





5) HTN


-c/w carvedidol, Norvasc, vasotec








6) DM


c/w Januvia, glipizide








Discharge Exam





- Head Exam


Head Exam: NORMAL INSPECTION





- Eye Exam


Eye Exam: Normal appearance





- ENT Exam


ENT Exam: Mucous Membranes Moist





- Respiratory Exam


Respiratory Exam: Clear to PA & Lateral.  absent: Rales, Wheezes





- Cardiovascular Exam


Cardiovascular Exam: REGULAR RHYTHM, +S1, +S2





- GI/Abdominal Exam


GI & Abdominal Exam: Normal Bowel Sounds, Soft.  absent: Guarding, Rebound, 

Tenderness





- Extremities Exam


Extremities exam: normal inspection





- Neurological Exam


Neurological exam: Alert


Additional comments: 





LUE mild monoparesis that improved. RLL mild monoparesis that improved. Muscle 

strenght 3/5


dysarthria baseline





- Psychiatric Exam


Psychiatric exam: Normal Affect





- Skin


Skin Exam: Normal Color





Discharge Plan





- Follow Up Plan


Condition: FAIR


Disposition: HOME/ ROUTINE


Instructions:  Weakness (ED)


Additional Instructions: 


pt. cleared for discharge to [facility] today by  and 


pending echo results- awaiting  to read results


Referrals: 


Alvino Hargrove MD [Staff Provider] -

## 2018-05-09 NOTE — CARD
--------------- APPROVED REPORT --------------





EXAM: Two-dimensional and M-mode echocardiogram with Doppler and 

color Doppler.



Other Information 

Quality : GoodRhythm : NSR



INDICATION

CVA/TIA 



2D DIMENSIONS 

IVSd1.36   (0.7-1.1cm)LVDd4.36   (3.9-5.9cm)

LVOT Diameter1.97   (1.8-2.4cm)PWd1.00   (0.7-1.1cm)

IVSs1.31   (0.8-1.2cm)LVDs2.87   (2.5-4.0cm)

FS (%) 34.1   %PWs1.31   (0.8-1.2cm)



M-Mode DIMENSIONS 

Left Atrium (MM)4.55   (2.5-4.0cm)IVSd1.16   (0.7-1.1cm)

Aortic Root3.25   (2.2-3.7cm)LVDd5.27   (4.0-5.6cm)

Aortic Cusp Exc.1.99   (1.5-2.0cm)PWd1.21   (0.7-1.1cm)

IVSs1.68   cmFS (%) 47   %

LVDs2.81   (2.0-3.8cm)PWs2.07   cm



Mitral Valve

MV E Wgoffdcv51.1cm/sMV DECEL [id number] A Grbjnxvx126.8cm/s

MV IAV04xyL/A ratio0.7MVA (PHT)2.91cm2



TDI

Lateral E' Peak V4.81cm/sMedial E' Peak V5.05cm/sE/Lateral E'16.0

E/Medial E'15.3



Tricuspid Valve

TR Peak [id number]/sRAP [id number] Peak Gr.22mmHg

[id number]



 LEFT VENTRICLE 

The left ventricle is normal size.

There is normal left ventricular wall thickness.

The left ventricular function is normal.

The left ventricular ejection fraction is 60%

There is normal LV segmental wall motion.

Transmitral Doppler flow pattern is Grade I-abnormal relaxation 

pattern.

No left ventricle thrombus noted on this study.

There is no ventricular septal defect visualized.

There is no left ventricular aneurysm. 

There is no mass noted in the left ventricle.



 RIGHT VENTRICLE 

The right ventricle is normal size.

There is normal right ventricular wall thickness.

The right ventricular systolic function is normal.



 ATRIA 

The left atrium size is normal.

The right atrium size is normal.

The interatrial septum is intact with no evidence for an atrial 

septal defect.



 AORTIC VALVE 

The aortic valve is normal in structure.

There is trace to mild aortic regurgitation. 

There is no aortic valvular stenosis. 

There is no aortic valvular vegetation.



 MITRAL VALVE 

The mitral valve is normal in structure.

There is no evidence of mitral valve prolapse.

There is no mitral valve stenosis.

There is no mitral valve regurgitation noted.



 TRICUSPID VALVE 

The tricuspid valve is normal in structure.

There is no tricuspid valve regurgitation noted.

There is no tricuspid valve prolapse or vegetation.

There is no tricuspid valve stenosis. 



 PULMONIC VALVE 

The pulmonary valve is normal in structure.

There is no pulmonic valvular regurgitation. 

There is no pulmonic valvular stenosis.



 GREAT VESSELS 

The aortic root is normal in size.

The ascending aorta is normal in size.

The IVC is normal in size and collapses >50% with inspiration.



 PERICARDIAL EFFUSION 

The pericardium appears normal.

There is no pleural effusion.



<Conclusion>

Normal LV Systolic function

Trace to Mild AI

Impaired Diastolic Relaxation

## 2018-05-09 NOTE — CP.PCM.PN
Objective





- Vital Signs/Intake and Output


Vital Signs (last 24 hours): 


 











Temp Pulse Resp BP Pulse Ox


 


 97.7 F   54 L  18   115/67   98 


 


 05/09/18 07:47  05/09/18 07:47  05/09/18 07:47  05/09/18 07:47  05/09/18 07:47











- Medications


Medications: 


 Current Medications





Acetaminophen (Tylenol 325mg Tab)  650 mg PO Q4 PRN


   PRN Reason: Pain, moderate (4-7)


Alendronate Sodium (Fosamax)  70 mg PO QWK [facility]


Amlodipine Besylate (Norvasc)  10 mg PO DAILY [facility]


   Last Admin: 05/08/18 12:15 Dose:  10 mg


Aripiprazole (Abilify)  5 mg PO HS [facility]


   Last Admin: 05/08/18 21:47 Dose:  5 mg


Aspirin (Ecotrin)  325 mg PO DAILY [facility]


   Last Admin: 05/08/18 11:11 Dose:  325 mg


Atorvastatin Calcium (Lipitor)  10 mg PO DAILY [facility]


   Last Admin: 05/08/18 11:12 Dose:  10 mg


Calcitriol (Rocaltrol)  0.5 mcg PO MOFR [facility]


   Last Admin: 05/07/18 05:38 Dose:  0.5 mcg


Carvedilol (Coreg)  6.25 mg PO Q12@0900,2100 [facility]


   Last Admin: 05/08/18 21:47 Dose:  Not Given


Dorzolamide HCl (Trusopt)  1 drop OU TID [facility]


   Last Admin: 05/08/18 17:41 Dose:  1 drop


Enalapril Maleate (Vasotec)  5 mg PO DAILY [facility]


   Last Admin: 05/08/18 12:15 Dose:  5 mg


Ergocalciferol (Drisdol 50,000 Intl Units Cap)  1 cap PO THU [facility]


Fenofibrate (Tricor)  145 mg PO DAILY [facility]


   Last Admin: 05/08/18 11:06 Dose:  145 mg


Glipizide (Glucotrol)  10 mg PO DAILY [facility]


   Last Admin: 05/08/18 11:12 Dose:  10 mg


Hydrochlorothiazide (Microzide)  12.5 mg PO DAILY [facility]


   Last Admin: 05/08/18 12:14 Dose:  12.5 mg


Insulin Human Regular (Humulin R)  0 units SC [facility]


   PRN Reason: Protocol


   Last Admin: 05/09/18 06:43 Dose:  Not Given


Lamotrigine (Lamictal)  50 mg PO HS [facility]


   Last Admin: 05/08/18 21:48 Dose:  50 mg


Pantoprazole Sodium (Protonix Ec Tab)  20 mg PO DAILY [facility]


   Last Admin: 05/08/18 11:12 Dose:  20 mg


Rivaroxaban (Xarelto)  15 mg PO DAILY [facility]


   PRN Reason: Protocol


   Last Admin: 05/08/18 11:13 Dose:  15 mg


Sitagliptin Phosphate (Januvia)  25 mg PO DAILY [facility]


   Last Admin: 05/08/18 11:12 Dose:  25 mg


Timolol Maleate (Timoptic 0.5% [facility])  1 drop OU BID [facility]


   Last Admin: 05/08/18 17:41 Dose:  1 drop


Vitamin B Complex/Vit C/Folic Acid (Nephro-Julio Cesar)  1 tab PO DAILY [facility]


   Last Admin: 05/08/18 11:12 Dose:  1 tab











- Labs


Labs: 


 





 05/07/18 04:10 





 05/07/18 04:10 





 











PT  17.6 Seconds (9.8-13.1)  H  05/04/18  18:35    


 


INR  1.6  (0.9-1.2)  H  05/04/18  18:35    


 


APTT  32.9 Seconds (25.6-37.1)   05/04/18  18:35

## 2023-03-23 NOTE — PN
DATE:  05/06/2018



SUBJECTIVE:  The patient is seen and examined.  Interim events noted. 

Consults noted and appreciated.  The patient remains in Progressive Care

Unit on telemetry monitoring.  The patient is not able to provide

informative history or review of system.  No specific issue reported by

nursing staff.



PHYSICAL EXAMINATION:

GENERAL:  The patient is in no acute distress.

VITAL SIGNS:  Stable.

HEART:  S1 and S2, normal and regular.

LUNGS:  Good bilateral air exchange.

ABDOMEN:  Soft and nontender.

EXTREMITIES:  No calf swelling.  No tenderness.  No acute ischemia.

CENTRAL NERVOUS SYSTEM:  Essentially unchanged.



DIAGNOSTIC DATA:  Available diagnostic data reviewed.  Telemetry monitoring

does not show any significant arrhythmias.



PLAN:  Overall, the patient's general medical condition is stable.  Plan as

ordered.





__________________________________________

Alvino Hargrove MD





DD:  05/06/2018 7:07:22

DT:  05/06/2018 7:09:09

Job # [id number]
DATE:  05/07/2018



SUBJECTIVE:  The patient is seen and examined.  Interim events noted. 

Consults noted and appreciated.  Neurology followup and interventions noted

and appreciated.  The patient remains in Progressive Care Unit on telemetry

monitoring.  _____ more awake and conversant, although due to dementia

thorough history and review of systems is not available, but the patient

does speak with more than usual.



PHYSICAL EXAMINATION:

GENERAL:  The patient is in no acute distress.

VITAL SIGNS:  Stable.

HEART:  S1 and S2, normal and regular.

LUNGS:  Good bilateral air exchange.

ABDOMEN:  Soft and nontender.

EXTREMITIES:  No edema.  No calf swelling.  No tenderness.  No acute

ischemia.

CENTRAL NERVOUS SYSTEM:  Essentially unchanged.



DIAGNOSTIC DATA:  Available diagnostic data reviewed.  Telemetry monitoring

does not reveal significant arrhythmia.



PLAN:  Overall, the patient's general medical condition is stable.  Plan as

ordered.





__________________________________________

Alvino Hargrove MD





DD:  05/07/2018 9:05:59

DT:  05/07/2018 9:55:08

Job # [id number]
4 = No assist / stand by assistance